# Patient Record
Sex: MALE | Race: OTHER | Employment: UNEMPLOYED | URBAN - METROPOLITAN AREA
[De-identification: names, ages, dates, MRNs, and addresses within clinical notes are randomized per-mention and may not be internally consistent; named-entity substitution may affect disease eponyms.]

---

## 2019-03-11 ENCOUNTER — HOSPITAL ENCOUNTER (EMERGENCY)
Age: 49
Discharge: HOME | End: 2019-03-11
Attending: EMERGENCY MEDICINE
Payer: COMMERCIAL

## 2019-03-11 VITALS
HEART RATE: 94 BPM | WEIGHT: 250 LBS | HEIGHT: 73 IN | BODY MASS INDEX: 33.13 KG/M2 | SYSTOLIC BLOOD PRESSURE: 124 MMHG | DIASTOLIC BLOOD PRESSURE: 84 MMHG | OXYGEN SATURATION: 100 % | RESPIRATION RATE: 18 BRPM | TEMPERATURE: 99.2 F

## 2019-03-11 DIAGNOSIS — Z87.81 H/O FRACTURE OF RADIUS: ICD-10-CM

## 2019-03-11 DIAGNOSIS — F10.920 ACUTE ALCOHOLIC INTOXICATION WITHOUT COMPLICATION (HCC): Primary | ICD-10-CM

## 2019-03-11 DIAGNOSIS — Z91.81 H/O FALL: ICD-10-CM

## 2019-03-11 PROCEDURE — 99283 EMERGENCY DEPT VISIT LOW MDM: CPT

## 2019-03-11 PROCEDURE — 6370000000 HC RX 637 (ALT 250 FOR IP): Performed by: NURSE PRACTITIONER

## 2019-03-11 RX ORDER — TRAMADOL HYDROCHLORIDE 50 MG/1
50 TABLET ORAL EVERY 6 HOURS PRN
Qty: 6 TABLET | Refills: 0 | Status: SHIPPED | OUTPATIENT
Start: 2019-03-11 | End: 2019-03-11 | Stop reason: SDUPTHER

## 2019-03-11 RX ORDER — TRAMADOL HYDROCHLORIDE 50 MG/1
50 TABLET ORAL EVERY 6 HOURS PRN
Qty: 6 TABLET | Refills: 0 | Status: SHIPPED | OUTPATIENT
Start: 2019-03-11 | End: 2019-03-13

## 2019-03-11 RX ORDER — TRAMADOL HYDROCHLORIDE 50 MG/1
50 TABLET ORAL ONCE
Status: COMPLETED | OUTPATIENT
Start: 2019-03-11 | End: 2019-03-11

## 2019-03-11 RX ADMIN — TRAMADOL HYDROCHLORIDE 50 MG: 50 TABLET, FILM COATED ORAL at 14:05

## 2019-03-11 ASSESSMENT — ENCOUNTER SYMPTOMS
SORE THROAT: 0
NAUSEA: 0
ABDOMINAL PAIN: 0
DIARRHEA: 0
RHINORRHEA: 0
CONSTIPATION: 0
SHORTNESS OF BREATH: 0
VOMITING: 0
BLOOD IN STOOL: 0

## 2019-03-11 ASSESSMENT — PAIN SCALES - GENERAL
PAINLEVEL_OUTOF10: 10
PAINLEVEL_OUTOF10: 10

## 2019-03-11 ASSESSMENT — PAIN DESCRIPTION - PAIN TYPE: TYPE: ACUTE PAIN

## 2019-03-18 ENCOUNTER — HOSPITAL ENCOUNTER (EMERGENCY)
Age: 49
Discharge: HOME | End: 2019-03-19
Attending: EMERGENCY MEDICINE
Payer: COMMERCIAL

## 2019-03-18 ENCOUNTER — APPOINTMENT (OUTPATIENT)
Dept: GENERAL RADIOLOGY | Age: 49
End: 2019-03-18
Payer: COMMERCIAL

## 2019-03-18 DIAGNOSIS — S62.101S RIGHT WRIST FRACTURE, SEQUELA: ICD-10-CM

## 2019-03-18 DIAGNOSIS — Z13.9 ENCOUNTER FOR MEDICAL SCREENING EXAMINATION: Primary | ICD-10-CM

## 2019-03-18 DIAGNOSIS — F10.920 ACUTE ALCOHOLIC INTOXICATION WITHOUT COMPLICATION (HCC): ICD-10-CM

## 2019-03-18 PROCEDURE — 4500000023 HC ED LEVEL 3 PROCEDURE

## 2019-03-18 PROCEDURE — 73120 X-RAY EXAM OF HAND: CPT

## 2019-03-18 PROCEDURE — 99283 EMERGENCY DEPT VISIT LOW MDM: CPT

## 2019-03-18 ASSESSMENT — PAIN SCALES - GENERAL
PAINLEVEL_OUTOF10: 10
PAINLEVEL_OUTOF10: 0

## 2019-03-18 ASSESSMENT — PAIN DESCRIPTION - ORIENTATION
ORIENTATION: RIGHT
ORIENTATION: RIGHT

## 2019-03-18 ASSESSMENT — PAIN DESCRIPTION - DESCRIPTORS
DESCRIPTORS: CONSTANT
DESCRIPTORS: ACHING;CONSTANT

## 2019-03-18 ASSESSMENT — PAIN DESCRIPTION - LOCATION
LOCATION: HAND;WRIST
LOCATION: HAND

## 2019-03-18 ASSESSMENT — PAIN DESCRIPTION - PAIN TYPE
TYPE: ACUTE PAIN
TYPE: ACUTE PAIN

## 2019-03-19 VITALS
OXYGEN SATURATION: 96 % | DIASTOLIC BLOOD PRESSURE: 79 MMHG | BODY MASS INDEX: 30.45 KG/M2 | HEIGHT: 73 IN | HEART RATE: 80 BPM | SYSTOLIC BLOOD PRESSURE: 128 MMHG | WEIGHT: 229.72 LBS | RESPIRATION RATE: 18 BRPM | TEMPERATURE: 97.9 F

## 2019-03-19 VITALS
SYSTOLIC BLOOD PRESSURE: 125 MMHG | RESPIRATION RATE: 21 BRPM | DIASTOLIC BLOOD PRESSURE: 70 MMHG | TEMPERATURE: 97.8 F | HEART RATE: 70 BPM | OXYGEN SATURATION: 91 %

## 2019-03-19 DIAGNOSIS — F10.10 ALCOHOL ABUSE: Primary | ICD-10-CM

## 2019-03-19 DIAGNOSIS — F10.920 ACUTE ALCOHOLIC INTOXICATION WITHOUT COMPLICATION (HCC): ICD-10-CM

## 2019-03-19 LAB
CHP ED QC CHECK: YES
GLUCOSE BLD-MCNC: 86 MG/DL (ref 70–99)
PERFORMED ON: NORMAL

## 2019-03-19 PROCEDURE — 99284 EMERGENCY DEPT VISIT MOD MDM: CPT

## 2019-03-19 PROCEDURE — 6360000002 HC RX W HCPCS

## 2019-03-19 RX ORDER — IBUPROFEN 400 MG/1
400 TABLET ORAL EVERY 6 HOURS PRN
Qty: 14 TABLET | Refills: 0 | Status: SHIPPED | OUTPATIENT
Start: 2019-03-19 | End: 2019-03-26 | Stop reason: SDUPTHER

## 2019-03-19 RX ORDER — NALOXONE HYDROCHLORIDE 0.4 MG/ML
0.4 INJECTION, SOLUTION INTRAMUSCULAR; INTRAVENOUS; SUBCUTANEOUS ONCE
Status: COMPLETED | OUTPATIENT
Start: 2019-03-19 | End: 2019-03-19

## 2019-03-19 RX ORDER — NALOXONE HYDROCHLORIDE 0.4 MG/ML
INJECTION, SOLUTION INTRAMUSCULAR; INTRAVENOUS; SUBCUTANEOUS
Status: COMPLETED
Start: 2019-03-19 | End: 2019-03-19

## 2019-03-19 RX ADMIN — NALOXONE HYDROCHLORIDE 0.4 MG: 0.4 INJECTION, SOLUTION INTRAMUSCULAR; INTRAVENOUS; SUBCUTANEOUS at 17:54

## 2019-03-19 ASSESSMENT — ENCOUNTER SYMPTOMS
COLOR CHANGE: 0
RESPIRATORY NEGATIVE: 1
VOMITING: 0
NAUSEA: 0
ABDOMINAL PAIN: 0

## 2019-03-19 ASSESSMENT — PAIN SCALES - GENERAL
PAINLEVEL_OUTOF10: 0
PAINLEVEL_OUTOF10: 0

## 2019-03-21 ENCOUNTER — HOSPITAL ENCOUNTER (EMERGENCY)
Age: 49
Discharge: HOME | End: 2019-03-22
Attending: EMERGENCY MEDICINE
Payer: COMMERCIAL

## 2019-03-21 DIAGNOSIS — S62.101S RIGHT WRIST FRACTURE, SEQUELA: ICD-10-CM

## 2019-03-21 DIAGNOSIS — F10.920 ACUTE ALCOHOLIC INTOXICATION WITHOUT COMPLICATION (HCC): Primary | ICD-10-CM

## 2019-03-21 PROCEDURE — 99284 EMERGENCY DEPT VISIT MOD MDM: CPT

## 2019-03-22 VITALS
BODY MASS INDEX: 30.31 KG/M2 | HEIGHT: 73 IN | OXYGEN SATURATION: 96 % | TEMPERATURE: 98 F | DIASTOLIC BLOOD PRESSURE: 76 MMHG | SYSTOLIC BLOOD PRESSURE: 129 MMHG | RESPIRATION RATE: 20 BRPM | HEART RATE: 105 BPM

## 2019-03-22 PROCEDURE — 6370000000 HC RX 637 (ALT 250 FOR IP): Performed by: EMERGENCY MEDICINE

## 2019-03-22 RX ORDER — ACETAMINOPHEN 325 MG/1
650 TABLET ORAL ONCE
Status: COMPLETED | OUTPATIENT
Start: 2019-03-22 | End: 2019-03-22

## 2019-03-22 RX ADMIN — ACETAMINOPHEN 650 MG: 325 TABLET ORAL at 00:24

## 2019-03-22 ASSESSMENT — PAIN DESCRIPTION - LOCATION: LOCATION: ARM

## 2019-03-22 ASSESSMENT — PAIN SCALES - GENERAL
PAINLEVEL_OUTOF10: 5
PAINLEVEL_OUTOF10: 7
PAINLEVEL_OUTOF10: 10

## 2019-03-22 ASSESSMENT — PAIN DESCRIPTION - FREQUENCY: FREQUENCY: CONTINUOUS

## 2019-03-22 ASSESSMENT — PAIN DESCRIPTION - DESCRIPTORS: DESCRIPTORS: ACHING

## 2019-03-22 ASSESSMENT — PAIN DESCRIPTION - PAIN TYPE: TYPE: ACUTE PAIN

## 2019-03-25 ENCOUNTER — HOSPITAL ENCOUNTER (EMERGENCY)
Age: 49
Discharge: HOME | End: 2019-03-25
Attending: EMERGENCY MEDICINE
Payer: COMMERCIAL

## 2019-03-25 ENCOUNTER — APPOINTMENT (OUTPATIENT)
Dept: CT IMAGING | Age: 49
End: 2019-03-25
Payer: COMMERCIAL

## 2019-03-25 VITALS
OXYGEN SATURATION: 100 % | RESPIRATION RATE: 17 BRPM | TEMPERATURE: 97.6 F | DIASTOLIC BLOOD PRESSURE: 74 MMHG | SYSTOLIC BLOOD PRESSURE: 125 MMHG | HEART RATE: 84 BPM

## 2019-03-25 DIAGNOSIS — F10.20 ALCOHOL USE DISORDER, SEVERE, DEPENDENCE (HCC): Primary | ICD-10-CM

## 2019-03-25 DIAGNOSIS — H70.91 MASTOIDITIS OF RIGHT SIDE: ICD-10-CM

## 2019-03-25 DIAGNOSIS — J32.0 CHRONIC MAXILLARY SINUSITIS: ICD-10-CM

## 2019-03-25 LAB
A/G RATIO: 0.8 (ref 1.1–2.2)
ALBUMIN SERPL-MCNC: 3.6 G/DL (ref 3.4–5)
ALP BLD-CCNC: 146 U/L (ref 40–129)
ALT SERPL-CCNC: 16 U/L (ref 10–40)
AMPHETAMINE SCREEN, URINE: NORMAL
ANION GAP SERPL CALCULATED.3IONS-SCNC: 14 MMOL/L (ref 3–16)
AST SERPL-CCNC: 29 U/L (ref 15–37)
BARBITURATE SCREEN URINE: NORMAL
BENZODIAZEPINE SCREEN, URINE: NORMAL
BILIRUB SERPL-MCNC: 0.3 MG/DL (ref 0–1)
BILIRUBIN URINE: NEGATIVE
BLOOD, URINE: NEGATIVE
BUN BLDV-MCNC: 10 MG/DL (ref 7–20)
CALCIUM SERPL-MCNC: 9.1 MG/DL (ref 8.3–10.6)
CANNABINOID SCREEN URINE: NORMAL
CHLORIDE BLD-SCNC: 101 MMOL/L (ref 99–110)
CLARITY: CLEAR
CO2: 21 MMOL/L (ref 21–32)
COCAINE METABOLITE SCREEN URINE: NORMAL
COLOR: NORMAL
CREAT SERPL-MCNC: 0.7 MG/DL (ref 0.9–1.3)
EKG ATRIAL RATE: 80 BPM
EKG DIAGNOSIS: NORMAL
EKG P AXIS: 41 DEGREES
EKG P-R INTERVAL: 200 MS
EKG Q-T INTERVAL: 414 MS
EKG QRS DURATION: 96 MS
EKG QTC CALCULATION (BAZETT): 477 MS
EKG R AXIS: -7 DEGREES
EKG T AXIS: 71 DEGREES
EKG VENTRICULAR RATE: 80 BPM
ETHANOL: 47 MG/DL (ref 0–0.08)
GFR AFRICAN AMERICAN: >60
GFR NON-AFRICAN AMERICAN: >60
GLOBULIN: 4.4 G/DL
GLUCOSE BLD-MCNC: 108 MG/DL (ref 70–99)
GLUCOSE URINE: NEGATIVE MG/DL
HCT VFR BLD CALC: 34.8 % (ref 40.5–52.5)
HEMOGLOBIN: 10.9 G/DL (ref 13.5–17.5)
KETONES, URINE: NEGATIVE MG/DL
LACTIC ACID: 1.6 MMOL/L (ref 0.4–2)
LEUKOCYTE ESTERASE, URINE: NEGATIVE
Lab: NORMAL
MCH RBC QN AUTO: 22.2 PG (ref 26–34)
MCHC RBC AUTO-ENTMCNC: 31.5 G/DL (ref 31–36)
MCV RBC AUTO: 70.4 FL (ref 80–100)
METHADONE SCREEN, URINE: NORMAL
MICROSCOPIC EXAMINATION: NORMAL
NITRITE, URINE: NEGATIVE
OPIATE SCREEN URINE: NORMAL
OXYCODONE URINE: NORMAL
PDW BLD-RTO: 18.8 % (ref 12.4–15.4)
PH UA: 7
PH UA: 7 (ref 5–8)
PHENCYCLIDINE SCREEN URINE: NORMAL
PLATELET # BLD: 368 K/UL (ref 135–450)
PMV BLD AUTO: 7.1 FL (ref 5–10.5)
POTASSIUM REFLEX MAGNESIUM: 3.9 MMOL/L (ref 3.5–5.1)
PROPOXYPHENE SCREEN: NORMAL
PROTEIN UA: NEGATIVE MG/DL
RBC # BLD: 4.94 M/UL (ref 4.2–5.9)
SODIUM BLD-SCNC: 136 MMOL/L (ref 136–145)
SPECIFIC GRAVITY UA: <=1.005 (ref 1–1.03)
TOTAL PROTEIN: 8 G/DL (ref 6.4–8.2)
TROPONIN: <0.01 NG/ML
URINE REFLEX TO CULTURE: NORMAL
URINE TYPE: NORMAL
UROBILINOGEN, URINE: 0.2 E.U./DL
WBC # BLD: 8.8 K/UL (ref 4–11)

## 2019-03-25 PROCEDURE — 93010 ELECTROCARDIOGRAM REPORT: CPT | Performed by: INTERNAL MEDICINE

## 2019-03-25 PROCEDURE — 80053 COMPREHEN METABOLIC PANEL: CPT

## 2019-03-25 PROCEDURE — 84484 ASSAY OF TROPONIN QUANT: CPT

## 2019-03-25 PROCEDURE — G0480 DRUG TEST DEF 1-7 CLASSES: HCPCS

## 2019-03-25 PROCEDURE — 81003 URINALYSIS AUTO W/O SCOPE: CPT

## 2019-03-25 PROCEDURE — 6360000002 HC RX W HCPCS: Performed by: EMERGENCY MEDICINE

## 2019-03-25 PROCEDURE — 99284 EMERGENCY DEPT VISIT MOD MDM: CPT

## 2019-03-25 PROCEDURE — 2580000003 HC RX 258: Performed by: EMERGENCY MEDICINE

## 2019-03-25 PROCEDURE — 83605 ASSAY OF LACTIC ACID: CPT

## 2019-03-25 PROCEDURE — 96374 THER/PROPH/DIAG INJ IV PUSH: CPT

## 2019-03-25 PROCEDURE — 70450 CT HEAD/BRAIN W/O DYE: CPT

## 2019-03-25 PROCEDURE — 80307 DRUG TEST PRSMV CHEM ANLYZR: CPT

## 2019-03-25 PROCEDURE — 85027 COMPLETE CBC AUTOMATED: CPT

## 2019-03-25 PROCEDURE — 6370000000 HC RX 637 (ALT 250 FOR IP): Performed by: EMERGENCY MEDICINE

## 2019-03-25 PROCEDURE — 96361 HYDRATE IV INFUSION ADD-ON: CPT

## 2019-03-25 PROCEDURE — 93005 ELECTROCARDIOGRAM TRACING: CPT | Performed by: EMERGENCY MEDICINE

## 2019-03-25 RX ORDER — DOXYCYCLINE HYCLATE 100 MG
100 TABLET ORAL 2 TIMES DAILY
Qty: 28 TABLET | Refills: 0 | Status: SHIPPED | OUTPATIENT
Start: 2019-03-25 | End: 2019-04-08

## 2019-03-25 RX ORDER — 0.9 % SODIUM CHLORIDE 0.9 %
1000 INTRAVENOUS SOLUTION INTRAVENOUS ONCE
Status: COMPLETED | OUTPATIENT
Start: 2019-03-25 | End: 2019-03-25

## 2019-03-25 RX ORDER — NALOXONE HYDROCHLORIDE 0.4 MG/ML
0.4 INJECTION, SOLUTION INTRAMUSCULAR; INTRAVENOUS; SUBCUTANEOUS ONCE
Status: COMPLETED | OUTPATIENT
Start: 2019-03-25 | End: 2019-03-25

## 2019-03-25 RX ORDER — ACETAMINOPHEN 325 MG/1
650 TABLET ORAL ONCE
Status: COMPLETED | OUTPATIENT
Start: 2019-03-25 | End: 2019-03-25

## 2019-03-25 RX ADMIN — SODIUM CHLORIDE 1000 ML: 9 INJECTION, SOLUTION INTRAVENOUS at 12:07

## 2019-03-25 RX ADMIN — NALOXONE HYDROCHLORIDE 0.4 MG: 0.4 INJECTION, SOLUTION INTRAMUSCULAR; INTRAVENOUS; SUBCUTANEOUS at 12:07

## 2019-03-25 RX ADMIN — ACETAMINOPHEN 650 MG: 325 TABLET ORAL at 17:25

## 2019-03-25 ASSESSMENT — PAIN SCALES - GENERAL: PAINLEVEL_OUTOF10: 5

## 2019-03-26 ENCOUNTER — HOSPITAL ENCOUNTER (EMERGENCY)
Age: 49
Discharge: HOME | End: 2019-03-26
Payer: COMMERCIAL

## 2019-03-26 VITALS
HEART RATE: 98 BPM | WEIGHT: 229.94 LBS | DIASTOLIC BLOOD PRESSURE: 99 MMHG | RESPIRATION RATE: 16 BRPM | BODY MASS INDEX: 30.34 KG/M2 | SYSTOLIC BLOOD PRESSURE: 135 MMHG | OXYGEN SATURATION: 100 % | TEMPERATURE: 98.1 F

## 2019-03-26 DIAGNOSIS — Z76.0 ENCOUNTER FOR MEDICATION REFILL: Primary | ICD-10-CM

## 2019-03-26 DIAGNOSIS — F10.11 HISTORY OF ALCOHOL ABUSE: ICD-10-CM

## 2019-03-26 PROCEDURE — 99284 EMERGENCY DEPT VISIT MOD MDM: CPT

## 2019-03-26 RX ORDER — BENZONATATE 100 MG/1
100 CAPSULE ORAL 2 TIMES DAILY PRN
Qty: 30 CAPSULE | Refills: 0 | Status: SHIPPED | OUTPATIENT
Start: 2019-03-26 | End: 2019-04-02

## 2019-03-26 RX ORDER — IBUPROFEN 400 MG/1
400 TABLET ORAL EVERY 6 HOURS PRN
Qty: 30 TABLET | Refills: 0 | Status: SHIPPED | OUTPATIENT
Start: 2019-03-26 | End: 2019-09-11 | Stop reason: ALTCHOICE

## 2019-03-26 ASSESSMENT — PAIN DESCRIPTION - ONSET: ONSET: AWAKENED FROM SLEEP

## 2019-03-26 ASSESSMENT — PAIN DESCRIPTION - LOCATION: LOCATION: GROIN

## 2019-03-26 ASSESSMENT — PAIN SCALES - GENERAL: PAINLEVEL_OUTOF10: 10

## 2019-03-26 ASSESSMENT — ENCOUNTER SYMPTOMS
ABDOMINAL PAIN: 0
EYE REDNESS: 0
EYE DISCHARGE: 0
NAUSEA: 0
BACK PAIN: 0
SHORTNESS OF BREATH: 0
CHOKING: 0
FACIAL SWELLING: 0
VOMITING: 0
APNEA: 0

## 2019-03-26 ASSESSMENT — PAIN DESCRIPTION - ORIENTATION: ORIENTATION: MID

## 2019-03-26 ASSESSMENT — PAIN DESCRIPTION - PAIN TYPE: TYPE: ACUTE PAIN

## 2019-03-26 ASSESSMENT — PAIN DESCRIPTION - FREQUENCY: FREQUENCY: CONTINUOUS

## 2019-03-26 ASSESSMENT — PAIN DESCRIPTION - PROGRESSION: CLINICAL_PROGRESSION: GRADUALLY WORSENING

## 2019-03-26 ASSESSMENT — PAIN DESCRIPTION - DESCRIPTORS: DESCRIPTORS: SHARP

## 2019-03-27 ENCOUNTER — HOSPITAL ENCOUNTER (EMERGENCY)
Age: 49
Discharge: HOME | End: 2019-03-27
Attending: EMERGENCY MEDICINE
Payer: COMMERCIAL

## 2019-03-27 VITALS
RESPIRATION RATE: 14 BRPM | DIASTOLIC BLOOD PRESSURE: 78 MMHG | SYSTOLIC BLOOD PRESSURE: 133 MMHG | WEIGHT: 230 LBS | OXYGEN SATURATION: 93 % | BODY MASS INDEX: 30.48 KG/M2 | HEART RATE: 85 BPM | TEMPERATURE: 98 F | HEIGHT: 73 IN

## 2019-03-27 DIAGNOSIS — S52.501D CLOSED FRACTURE DISTAL RADIUS AND ULNA, RIGHT, WITH ROUTINE HEALING, SUBSEQUENT ENCOUNTER: ICD-10-CM

## 2019-03-27 DIAGNOSIS — F10.11 HISTORY OF ALCOHOL ABUSE: ICD-10-CM

## 2019-03-27 DIAGNOSIS — F19.10 POLYSUBSTANCE ABUSE (HCC): Primary | ICD-10-CM

## 2019-03-27 DIAGNOSIS — S52.601D CLOSED FRACTURE DISTAL RADIUS AND ULNA, RIGHT, WITH ROUTINE HEALING, SUBSEQUENT ENCOUNTER: ICD-10-CM

## 2019-03-27 DIAGNOSIS — Z86.59 HISTORY OF SCHIZOPHRENIA: ICD-10-CM

## 2019-03-27 LAB
A/G RATIO: 1 (ref 1.1–2.2)
ACETAMINOPHEN LEVEL: <5 UG/ML (ref 10–30)
ALBUMIN SERPL-MCNC: 4.2 G/DL (ref 3.4–5)
ALP BLD-CCNC: 154 U/L (ref 40–129)
ALT SERPL-CCNC: 15 U/L (ref 10–40)
ANION GAP SERPL CALCULATED.3IONS-SCNC: 13 MMOL/L (ref 3–16)
AST SERPL-CCNC: 23 U/L (ref 15–37)
BASOPHILS ABSOLUTE: 0 K/UL (ref 0–0.2)
BASOPHILS RELATIVE PERCENT: 0.1 %
BILIRUB SERPL-MCNC: 0.6 MG/DL (ref 0–1)
BILIRUBIN DIRECT: <0.2 MG/DL (ref 0–0.3)
BILIRUBIN, INDIRECT: NORMAL MG/DL (ref 0–1)
BUN BLDV-MCNC: 9 MG/DL (ref 7–20)
CALCIUM SERPL-MCNC: 9.4 MG/DL (ref 8.3–10.6)
CHLORIDE BLD-SCNC: 94 MMOL/L (ref 99–110)
CO2: 23 MMOL/L (ref 21–32)
CREAT SERPL-MCNC: 0.7 MG/DL (ref 0.9–1.3)
EOSINOPHILS ABSOLUTE: 0.2 K/UL (ref 0–0.6)
EOSINOPHILS RELATIVE PERCENT: 3 %
ETHANOL: NORMAL MG/DL (ref 0–0.08)
GFR AFRICAN AMERICAN: >60
GFR NON-AFRICAN AMERICAN: >60
GLOBULIN: 4.1 G/DL
GLUCOSE BLD-MCNC: 91 MG/DL (ref 70–99)
HCT VFR BLD CALC: 36.1 % (ref 40.5–52.5)
HEMOGLOBIN: 11.5 G/DL (ref 13.5–17.5)
INR BLD: 1.18 (ref 0.86–1.14)
LIPASE: 10 U/L (ref 13–60)
LYMPHOCYTES ABSOLUTE: 1.5 K/UL (ref 1–5.1)
LYMPHOCYTES RELATIVE PERCENT: 21.3 %
MCH RBC QN AUTO: 22.4 PG (ref 26–34)
MCHC RBC AUTO-ENTMCNC: 31.9 G/DL (ref 31–36)
MCV RBC AUTO: 70.1 FL (ref 80–100)
MONOCYTES ABSOLUTE: 0.8 K/UL (ref 0–1.3)
MONOCYTES RELATIVE PERCENT: 10.8 %
NEUTROPHILS ABSOLUTE: 4.5 K/UL (ref 1.7–7.7)
NEUTROPHILS RELATIVE PERCENT: 64.8 %
PDW BLD-RTO: 18.3 % (ref 12.4–15.4)
PLATELET # BLD: 416 K/UL (ref 135–450)
PMV BLD AUTO: 6.9 FL (ref 5–10.5)
POTASSIUM REFLEX MAGNESIUM: 4 MMOL/L (ref 3.5–5.1)
PROTHROMBIN TIME: 13.5 SEC (ref 9.8–13)
RBC # BLD: 5.15 M/UL (ref 4.2–5.9)
SALICYLATE, SERUM: <0.3 MG/DL (ref 15–30)
SODIUM BLD-SCNC: 130 MMOL/L (ref 136–145)
TOTAL PROTEIN: 8.3 G/DL (ref 6.4–8.2)
WBC # BLD: 7 K/UL (ref 4–11)

## 2019-03-27 PROCEDURE — 2500000003 HC RX 250 WO HCPCS: Performed by: PHYSICIAN ASSISTANT

## 2019-03-27 PROCEDURE — 85610 PROTHROMBIN TIME: CPT

## 2019-03-27 PROCEDURE — 6360000002 HC RX W HCPCS: Performed by: PHYSICIAN ASSISTANT

## 2019-03-27 PROCEDURE — 80053 COMPREHEN METABOLIC PANEL: CPT

## 2019-03-27 PROCEDURE — 83690 ASSAY OF LIPASE: CPT

## 2019-03-27 PROCEDURE — G0480 DRUG TEST DEF 1-7 CLASSES: HCPCS

## 2019-03-27 PROCEDURE — 2580000003 HC RX 258: Performed by: PHYSICIAN ASSISTANT

## 2019-03-27 PROCEDURE — 99284 EMERGENCY DEPT VISIT MOD MDM: CPT

## 2019-03-27 PROCEDURE — 36415 COLL VENOUS BLD VENIPUNCTURE: CPT

## 2019-03-27 PROCEDURE — 85025 COMPLETE CBC W/AUTO DIFF WBC: CPT

## 2019-03-27 PROCEDURE — 96360 HYDRATION IV INFUSION INIT: CPT

## 2019-03-27 PROCEDURE — 96361 HYDRATE IV INFUSION ADD-ON: CPT

## 2019-03-27 RX ADMIN — FOLIC ACID: 5 INJECTION, SOLUTION INTRAMUSCULAR; INTRAVENOUS; SUBCUTANEOUS at 17:35

## 2019-03-27 ASSESSMENT — ENCOUNTER SYMPTOMS
SORE THROAT: 0
SHORTNESS OF BREATH: 0
VOMITING: 0
ABDOMINAL PAIN: 0
DIARRHEA: 0
NAUSEA: 0
CHEST TIGHTNESS: 0
COLOR CHANGE: 0

## 2019-03-27 ASSESSMENT — PAIN DESCRIPTION - PAIN TYPE: TYPE: ACUTE PAIN

## 2019-03-27 ASSESSMENT — PAIN DESCRIPTION - INTENSITY: RATING_2: 10

## 2019-03-27 ASSESSMENT — PAIN DESCRIPTION - LOCATION
LOCATION_2: BACK
LOCATION: ANKLE

## 2019-03-27 ASSESSMENT — PAIN SCALES - GENERAL: PAINLEVEL_OUTOF10: 10

## 2019-03-30 ENCOUNTER — HOSPITAL ENCOUNTER (EMERGENCY)
Age: 49
Discharge: HOME | End: 2019-03-30
Attending: EMERGENCY MEDICINE
Payer: COMMERCIAL

## 2019-03-30 VITALS
WEIGHT: 230 LBS | BODY MASS INDEX: 30.48 KG/M2 | RESPIRATION RATE: 18 BRPM | TEMPERATURE: 98.3 F | HEIGHT: 73 IN | SYSTOLIC BLOOD PRESSURE: 116 MMHG | OXYGEN SATURATION: 99 % | HEART RATE: 88 BPM | DIASTOLIC BLOOD PRESSURE: 68 MMHG

## 2019-03-30 DIAGNOSIS — M25.531 RIGHT WRIST PAIN: Primary | ICD-10-CM

## 2019-03-30 PROCEDURE — 99283 EMERGENCY DEPT VISIT LOW MDM: CPT

## 2019-03-30 ASSESSMENT — PAIN SCALES - GENERAL
PAINLEVEL_OUTOF10: 0
PAINLEVEL_OUTOF10: 10

## 2019-03-30 ASSESSMENT — PAIN DESCRIPTION - PAIN TYPE: TYPE: ACUTE PAIN

## 2019-03-30 ASSESSMENT — PAIN DESCRIPTION - FREQUENCY: FREQUENCY: CONTINUOUS

## 2019-03-30 ASSESSMENT — PAIN DESCRIPTION - LOCATION: LOCATION: KNEE

## 2019-03-31 ASSESSMENT — ENCOUNTER SYMPTOMS: COLOR CHANGE: 0

## 2019-03-31 NOTE — ED PROVIDER NOTES
Allergic/Immunologic: Negative for immunocompromised state. Neurological: Negative for weakness and numbness. All other systems reviewed and are negative. Positives and Pertinent negatives as per HPI. Except as noted above in the ROS, problem specific ROS was completed and is negative. Physical Exam:  Physical Exam   Constitutional: He is oriented to person, place, and time. Vital signs are normal. He appears well-developed and well-nourished. Non-toxic appearance. No distress. HENT:   Head: Normocephalic and atraumatic. Eyes: Conjunctivae are normal. No scleral icterus. Neck: Normal range of motion. No JVD present. Pulmonary/Chest: Effort normal. No respiratory distress. Musculoskeletal: Normal range of motion. Patient has what appears to be a volar OCL on the right distal arm. The hand is exposed and is pink and well perfused. Capillary refill less than 2 seconds. Muscles appear soft. On my limited exam he is neurovascularly intact without deficits. Neurological: He is alert and oriented to person, place, and time. No cranial nerve deficit. Skin: Skin is warm, dry and intact. No rash noted. Psychiatric: He has a normal mood and affect. Nursing note and vitals reviewed. MEDICAL DECISION MAKING    Vitals:    Vitals:    03/30/19 2200 03/30/19 2338   BP: 114/66 116/68   Pulse: 87 88   Resp: 16 18   Temp: 98.1 °F (36.7 °C) 98.3 °F (36.8 °C)   SpO2: 99%    Weight: 230 lb (104.3 kg)    Height: 6' 1\" (1.854 m)        LABS:Labs Reviewed - No data to display     Remainder of labs reviewed and werenegative at this time or not returned at the time of this note.     RADIOLOGY:   Non-plain film images such as CT, Ultrasound and MRI are read by the radiologist. Celedonio Kanner, APRN - CNP have directly visualized the radiologic plain film image(s) with the below findings:        Interpretation per the Radiologist below, if available at the time of thisnote:    No orders to display patient was reviewed today.  YEMI Del Castillo CNP)      (Please note the MDM and HPI sections of this note were completed with a voice recognition program.  Efforts weremade to edit the dictations but occasionally words are mis-transcribed.)    Electronically signed, YEMI Del Castillo CNP,           YEMI Del Castillo CNP  03/31/19 1096
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
## 2019-09-11 ENCOUNTER — HOSPITAL ENCOUNTER (INPATIENT)
Age: 49
LOS: 2 days | Discharge: HOME | End: 2019-09-16
Attending: EMERGENCY MEDICINE | Admitting: INTERNAL MEDICINE
Payer: COMMERCIAL

## 2019-09-11 DIAGNOSIS — F19.10 POLYSUBSTANCE ABUSE (HCC): ICD-10-CM

## 2019-09-11 DIAGNOSIS — Z87.898 HISTORY OF SEIZURE: ICD-10-CM

## 2019-09-11 DIAGNOSIS — F10.939 ALCOHOL WITHDRAWAL SYNDROME WITH COMPLICATION (HCC): Primary | ICD-10-CM

## 2019-09-11 PROBLEM — F10.121 ALCOHOL INTOXICATION DELIRIUM (HCC): Status: ACTIVE | Noted: 2019-09-11

## 2019-09-11 PROBLEM — F10.929 ALCOHOL INTOXICATION (HCC): Status: ACTIVE | Noted: 2019-09-11

## 2019-09-11 LAB
A/G RATIO: 1.1 (ref 1.1–2.2)
ACETAMINOPHEN LEVEL: <5 UG/ML (ref 10–30)
ALBUMIN SERPL-MCNC: 4.4 G/DL (ref 3.4–5)
ALP BLD-CCNC: 127 U/L (ref 40–129)
ALT SERPL-CCNC: 13 U/L (ref 10–40)
AMPHETAMINE SCREEN, URINE: ABNORMAL
ANION GAP SERPL CALCULATED.3IONS-SCNC: 16 MMOL/L (ref 3–16)
AST SERPL-CCNC: 25 U/L (ref 15–37)
BARBITURATE SCREEN URINE: ABNORMAL
BASOPHILS ABSOLUTE: 0.1 K/UL (ref 0–0.2)
BASOPHILS RELATIVE PERCENT: 1.4 %
BENZODIAZEPINE SCREEN, URINE: POSITIVE
BILIRUB SERPL-MCNC: 0.3 MG/DL (ref 0–1)
BILIRUBIN DIRECT: <0.2 MG/DL (ref 0–0.3)
BILIRUBIN URINE: NEGATIVE
BILIRUBIN, INDIRECT: ABNORMAL MG/DL (ref 0–1)
BLOOD, URINE: NEGATIVE
BUN BLDV-MCNC: 7 MG/DL (ref 7–20)
CALCIUM SERPL-MCNC: 9 MG/DL (ref 8.3–10.6)
CANNABINOID SCREEN URINE: ABNORMAL
CHLORIDE BLD-SCNC: 99 MMOL/L (ref 99–110)
CLARITY: ABNORMAL
CO2: 21 MMOL/L (ref 21–32)
COCAINE METABOLITE SCREEN URINE: ABNORMAL
COLOR: YELLOW
CREAT SERPL-MCNC: 0.8 MG/DL (ref 0.9–1.3)
EOSINOPHILS ABSOLUTE: 0.2 K/UL (ref 0–0.6)
EOSINOPHILS RELATIVE PERCENT: 3.4 %
EPITHELIAL CELLS, UA: 0 /HPF (ref 0–5)
ETHANOL: 180 MG/DL (ref 0–0.08)
GFR AFRICAN AMERICAN: >60
GFR NON-AFRICAN AMERICAN: >60
GLOBULIN: 4.1 G/DL
GLUCOSE BLD-MCNC: 97 MG/DL (ref 70–99)
GLUCOSE URINE: NEGATIVE MG/DL
HCT VFR BLD CALC: 42 % (ref 40.5–52.5)
HEMOGLOBIN: 13.9 G/DL (ref 13.5–17.5)
HYALINE CASTS: 1 /LPF (ref 0–8)
INR BLD: 0.9 (ref 0.86–1.14)
KETONES, URINE: NEGATIVE MG/DL
LEUKOCYTE ESTERASE, URINE: NEGATIVE
LIPASE: 20 U/L (ref 13–60)
LYMPHOCYTES ABSOLUTE: 1.8 K/UL (ref 1–5.1)
LYMPHOCYTES RELATIVE PERCENT: 32.3 %
Lab: ABNORMAL
MAGNESIUM: 2.3 MG/DL (ref 1.8–2.4)
MCH RBC QN AUTO: 28.7 PG (ref 26–34)
MCHC RBC AUTO-ENTMCNC: 33.2 G/DL (ref 31–36)
MCV RBC AUTO: 86.4 FL (ref 80–100)
METHADONE SCREEN, URINE: ABNORMAL
MICROSCOPIC EXAMINATION: YES
MONOCYTES ABSOLUTE: 0.6 K/UL (ref 0–1.3)
MONOCYTES RELATIVE PERCENT: 11.3 %
NEUTROPHILS ABSOLUTE: 2.9 K/UL (ref 1.7–7.7)
NEUTROPHILS RELATIVE PERCENT: 51.6 %
NITRITE, URINE: NEGATIVE
OPIATE SCREEN URINE: ABNORMAL
OXYCODONE URINE: ABNORMAL
PDW BLD-RTO: 15.3 % (ref 12.4–15.4)
PH UA: 5.5
PH UA: 5.5 (ref 5–8)
PHENCYCLIDINE SCREEN URINE: ABNORMAL
PLATELET # BLD: 175 K/UL (ref 135–450)
PMV BLD AUTO: 7.7 FL (ref 5–10.5)
POTASSIUM SERPL-SCNC: 4.2 MMOL/L (ref 3.5–5.1)
PROPOXYPHENE SCREEN: ABNORMAL
PROTEIN UA: NEGATIVE MG/DL
PROTHROMBIN TIME: 10.3 SEC (ref 9.8–13)
RBC # BLD: 4.87 M/UL (ref 4.2–5.9)
RBC UA: 0 /HPF (ref 0–4)
SALICYLATE, SERUM: <0.3 MG/DL (ref 15–30)
SODIUM BLD-SCNC: 136 MMOL/L (ref 136–145)
SPECIFIC GRAVITY UA: 1.01 (ref 1–1.03)
TOTAL PROTEIN: 8.5 G/DL (ref 6.4–8.2)
TROPONIN: <0.01 NG/ML
URINE REFLEX TO CULTURE: ABNORMAL
URINE TYPE: ABNORMAL
UROBILINOGEN, URINE: 0.2 E.U./DL
WBC # BLD: 5.6 K/UL (ref 4–11)
WBC UA: 0 /HPF (ref 0–5)

## 2019-09-11 PROCEDURE — 84484 ASSAY OF TROPONIN QUANT: CPT

## 2019-09-11 PROCEDURE — 2580000003 HC RX 258: Performed by: PHYSICIAN ASSISTANT

## 2019-09-11 PROCEDURE — 99284 EMERGENCY DEPT VISIT MOD MDM: CPT

## 2019-09-11 PROCEDURE — 83690 ASSAY OF LIPASE: CPT

## 2019-09-11 PROCEDURE — 80307 DRUG TEST PRSMV CHEM ANLYZR: CPT

## 2019-09-11 PROCEDURE — 82248 BILIRUBIN DIRECT: CPT

## 2019-09-11 PROCEDURE — 81001 URINALYSIS AUTO W/SCOPE: CPT

## 2019-09-11 PROCEDURE — 36415 COLL VENOUS BLD VENIPUNCTURE: CPT

## 2019-09-11 PROCEDURE — 85025 COMPLETE CBC W/AUTO DIFF WBC: CPT

## 2019-09-11 PROCEDURE — 85610 PROTHROMBIN TIME: CPT

## 2019-09-11 PROCEDURE — 96365 THER/PROPH/DIAG IV INF INIT: CPT

## 2019-09-11 PROCEDURE — 6360000002 HC RX W HCPCS: Performed by: PHYSICIAN ASSISTANT

## 2019-09-11 PROCEDURE — 6360000002 HC RX W HCPCS: Performed by: INTERNAL MEDICINE

## 2019-09-11 PROCEDURE — 2580000003 HC RX 258: Performed by: INTERNAL MEDICINE

## 2019-09-11 PROCEDURE — 80053 COMPREHEN METABOLIC PANEL: CPT

## 2019-09-11 PROCEDURE — 96372 THER/PROPH/DIAG INJ SC/IM: CPT

## 2019-09-11 PROCEDURE — 2500000003 HC RX 250 WO HCPCS: Performed by: PHYSICIAN ASSISTANT

## 2019-09-11 PROCEDURE — G0378 HOSPITAL OBSERVATION PER HR: HCPCS

## 2019-09-11 PROCEDURE — 83735 ASSAY OF MAGNESIUM: CPT

## 2019-09-11 PROCEDURE — G0480 DRUG TEST DEF 1-7 CLASSES: HCPCS

## 2019-09-11 PROCEDURE — 96366 THER/PROPH/DIAG IV INF ADDON: CPT

## 2019-09-11 PROCEDURE — 6370000000 HC RX 637 (ALT 250 FOR IP): Performed by: PHYSICIAN ASSISTANT

## 2019-09-11 PROCEDURE — 6370000000 HC RX 637 (ALT 250 FOR IP): Performed by: INTERNAL MEDICINE

## 2019-09-11 PROCEDURE — 6370000000 HC RX 637 (ALT 250 FOR IP)

## 2019-09-11 RX ORDER — ONDANSETRON 2 MG/ML
4 INJECTION INTRAMUSCULAR; INTRAVENOUS EVERY 6 HOURS PRN
Status: DISCONTINUED | OUTPATIENT
Start: 2019-09-11 | End: 2019-09-16 | Stop reason: HOSPADM

## 2019-09-11 RX ORDER — SODIUM CHLORIDE 0.9 % (FLUSH) 0.9 %
10 SYRINGE (ML) INJECTION EVERY 12 HOURS SCHEDULED
Status: DISCONTINUED | OUTPATIENT
Start: 2019-09-11 | End: 2019-09-16 | Stop reason: HOSPADM

## 2019-09-11 RX ORDER — LORAZEPAM 2 MG/ML
1 INJECTION INTRAMUSCULAR
Status: DISCONTINUED | OUTPATIENT
Start: 2019-09-11 | End: 2019-09-16 | Stop reason: HOSPADM

## 2019-09-11 RX ORDER — SODIUM CHLORIDE 0.9 % (FLUSH) 0.9 %
10 SYRINGE (ML) INJECTION PRN
Status: DISCONTINUED | OUTPATIENT
Start: 2019-09-11 | End: 2019-09-16 | Stop reason: HOSPADM

## 2019-09-11 RX ORDER — LORAZEPAM 1 MG/1
1 TABLET ORAL
Status: DISCONTINUED | OUTPATIENT
Start: 2019-09-11 | End: 2019-09-16 | Stop reason: HOSPADM

## 2019-09-11 RX ORDER — MULTIVITAMIN WITH FOLIC ACID 400 MCG
1 TABLET ORAL DAILY
Status: DISCONTINUED | OUTPATIENT
Start: 2019-09-11 | End: 2019-09-16 | Stop reason: HOSPADM

## 2019-09-11 RX ORDER — LORAZEPAM 1 MG/1
2 TABLET ORAL
Status: DISCONTINUED | OUTPATIENT
Start: 2019-09-11 | End: 2019-09-16 | Stop reason: HOSPADM

## 2019-09-11 RX ORDER — LORAZEPAM 2 MG/ML
3 INJECTION INTRAMUSCULAR
Status: DISCONTINUED | OUTPATIENT
Start: 2019-09-11 | End: 2019-09-16 | Stop reason: HOSPADM

## 2019-09-11 RX ORDER — LORAZEPAM 2 MG/ML
2 INJECTION INTRAMUSCULAR
Status: DISCONTINUED | OUTPATIENT
Start: 2019-09-11 | End: 2019-09-16 | Stop reason: HOSPADM

## 2019-09-11 RX ORDER — ACETAMINOPHEN 325 MG/1
650 TABLET ORAL EVERY 4 HOURS PRN
Status: DISCONTINUED | OUTPATIENT
Start: 2019-09-11 | End: 2019-09-16 | Stop reason: HOSPADM

## 2019-09-11 RX ORDER — SODIUM CHLORIDE 0.9 % (FLUSH) 0.9 %
10 SYRINGE (ML) INJECTION EVERY 12 HOURS SCHEDULED
Status: DISCONTINUED | OUTPATIENT
Start: 2019-09-11 | End: 2019-09-11 | Stop reason: SDUPTHER

## 2019-09-11 RX ORDER — THIAMINE HCL 100 MG
100 TABLET ORAL DAILY
Status: DISCONTINUED | OUTPATIENT
Start: 2019-09-11 | End: 2019-09-16 | Stop reason: HOSPADM

## 2019-09-11 RX ORDER — FOLIC ACID 1 MG/1
1 TABLET ORAL DAILY
Status: DISCONTINUED | OUTPATIENT
Start: 2019-09-11 | End: 2019-09-16 | Stop reason: HOSPADM

## 2019-09-11 RX ORDER — LORAZEPAM 1 MG/1
TABLET ORAL
Status: COMPLETED
Start: 2019-09-11 | End: 2019-09-11

## 2019-09-11 RX ORDER — SODIUM CHLORIDE 0.9 % (FLUSH) 0.9 %
10 SYRINGE (ML) INJECTION PRN
Status: DISCONTINUED | OUTPATIENT
Start: 2019-09-11 | End: 2019-09-11 | Stop reason: SDUPTHER

## 2019-09-11 RX ORDER — DOCUSATE SODIUM 100 MG/1
100 CAPSULE, LIQUID FILLED ORAL 2 TIMES DAILY PRN
Status: DISCONTINUED | OUTPATIENT
Start: 2019-09-11 | End: 2019-09-16 | Stop reason: HOSPADM

## 2019-09-11 RX ORDER — LORAZEPAM 1 MG/1
3 TABLET ORAL
Status: DISCONTINUED | OUTPATIENT
Start: 2019-09-11 | End: 2019-09-16 | Stop reason: HOSPADM

## 2019-09-11 RX ORDER — LORAZEPAM 1 MG/1
4 TABLET ORAL
Status: DISCONTINUED | OUTPATIENT
Start: 2019-09-11 | End: 2019-09-16 | Stop reason: HOSPADM

## 2019-09-11 RX ORDER — LORAZEPAM 2 MG/ML
4 INJECTION INTRAMUSCULAR
Status: DISCONTINUED | OUTPATIENT
Start: 2019-09-11 | End: 2019-09-16 | Stop reason: HOSPADM

## 2019-09-11 RX ADMIN — LORAZEPAM 1 MG: 1 TABLET ORAL at 16:21

## 2019-09-11 RX ADMIN — LORAZEPAM 1 MG: 1 TABLET ORAL at 21:11

## 2019-09-11 RX ADMIN — THERA TABS 1 TABLET: TAB at 18:31

## 2019-09-11 RX ADMIN — Medication 100 MG: at 21:11

## 2019-09-11 RX ADMIN — ENOXAPARIN SODIUM 40 MG: 40 INJECTION SUBCUTANEOUS at 18:32

## 2019-09-11 RX ADMIN — ACETAMINOPHEN 650 MG: 325 TABLET, FILM COATED ORAL at 18:30

## 2019-09-11 RX ADMIN — LORAZEPAM 1 MG: 1 TABLET ORAL at 18:40

## 2019-09-11 RX ADMIN — FOLIC ACID 1 MG: 1 TABLET ORAL at 18:31

## 2019-09-11 RX ADMIN — THIAMINE HYDROCHLORIDE: 100 INJECTION, SOLUTION INTRAMUSCULAR; INTRAVENOUS at 10:44

## 2019-09-11 RX ADMIN — Medication 10 ML: at 21:12

## 2019-09-11 ASSESSMENT — ENCOUNTER SYMPTOMS
ABDOMINAL PAIN: 0
CHEST TIGHTNESS: 0
VOMITING: 0
SHORTNESS OF BREATH: 0
NAUSEA: 0
DIARRHEA: 0

## 2019-09-11 ASSESSMENT — PAIN SCALES - GENERAL
PAINLEVEL_OUTOF10: 8
PAINLEVEL_OUTOF10: 0
PAINLEVEL_OUTOF10: 8

## 2019-09-11 ASSESSMENT — PAIN DESCRIPTION - PAIN TYPE: TYPE: CHRONIC PAIN

## 2019-09-11 NOTE — ED NOTES
Pharmacy Medication History Note      List of current medications patient is taking is complete. Source of information: patient    Changes made to medication list:  Medications flagged for removal (include reason, ex. noncompliance):  N/A    Medications removed (include reason, ex. therapy complete or physician discontinued): Ibuprofen- therapy complete    Medications added/doses adjusted:  N/A    Other notes (ex. Recent course of antibiotics, Coumadin dosing):  Denies use of other OTC or herbal medications. Last dose times updated. Debi Vaz [facility]    No current facility-administered medications on file prior to encounter.         Current Outpatient Medications on File Prior to Encounter   Medication Sig Dispense Refill    [DISCONTINUED] ibuprofen (IBU) 400 MG tablet Take 1 tablet by mouth every 6 hours as needed for Pain 30 tablet 0

## 2019-09-12 LAB
A/G RATIO: 1.1 (ref 1.1–2.2)
ALBUMIN SERPL-MCNC: 4.1 G/DL (ref 3.4–5)
ALP BLD-CCNC: 120 U/L (ref 40–129)
ALT SERPL-CCNC: 11 U/L (ref 10–40)
ANION GAP SERPL CALCULATED.3IONS-SCNC: 9 MMOL/L (ref 3–16)
AST SERPL-CCNC: 21 U/L (ref 15–37)
BASOPHILS ABSOLUTE: 0 K/UL (ref 0–0.2)
BASOPHILS RELATIVE PERCENT: 1.1 %
BILIRUB SERPL-MCNC: 0.4 MG/DL (ref 0–1)
BUN BLDV-MCNC: 9 MG/DL (ref 7–20)
CALCIUM SERPL-MCNC: 9.3 MG/DL (ref 8.3–10.6)
CHLORIDE BLD-SCNC: 104 MMOL/L (ref 99–110)
CO2: 24 MMOL/L (ref 21–32)
CREAT SERPL-MCNC: 0.7 MG/DL (ref 0.9–1.3)
EOSINOPHILS ABSOLUTE: 0.3 K/UL (ref 0–0.6)
EOSINOPHILS RELATIVE PERCENT: 6.4 %
GFR AFRICAN AMERICAN: >60
GFR NON-AFRICAN AMERICAN: >60
GLOBULIN: 3.8 G/DL
GLUCOSE BLD-MCNC: 111 MG/DL (ref 70–99)
HCT VFR BLD CALC: 44.2 % (ref 40.5–52.5)
HEMOGLOBIN: 14.7 G/DL (ref 13.5–17.5)
LYMPHOCYTES ABSOLUTE: 1.4 K/UL (ref 1–5.1)
LYMPHOCYTES RELATIVE PERCENT: 34.3 %
MCH RBC QN AUTO: 28.8 PG (ref 26–34)
MCHC RBC AUTO-ENTMCNC: 33.2 G/DL (ref 31–36)
MCV RBC AUTO: 86.7 FL (ref 80–100)
MONOCYTES ABSOLUTE: 0.5 K/UL (ref 0–1.3)
MONOCYTES RELATIVE PERCENT: 12.4 %
NEUTROPHILS ABSOLUTE: 1.8 K/UL (ref 1.7–7.7)
NEUTROPHILS RELATIVE PERCENT: 45.8 %
PDW BLD-RTO: 15.4 % (ref 12.4–15.4)
PHOSPHORUS: 3.9 MG/DL (ref 2.5–4.9)
PLATELET # BLD: 164 K/UL (ref 135–450)
PMV BLD AUTO: 7.5 FL (ref 5–10.5)
POTASSIUM REFLEX MAGNESIUM: 4.1 MMOL/L (ref 3.5–5.1)
RBC # BLD: 5.1 M/UL (ref 4.2–5.9)
SODIUM BLD-SCNC: 137 MMOL/L (ref 136–145)
TOTAL PROTEIN: 7.9 G/DL (ref 6.4–8.2)
TROPONIN: <0.01 NG/ML
WBC # BLD: 3.9 K/UL (ref 4–11)

## 2019-09-12 PROCEDURE — 36415 COLL VENOUS BLD VENIPUNCTURE: CPT

## 2019-09-12 PROCEDURE — 6360000002 HC RX W HCPCS: Performed by: INTERNAL MEDICINE

## 2019-09-12 PROCEDURE — 96372 THER/PROPH/DIAG INJ SC/IM: CPT

## 2019-09-12 PROCEDURE — 6370000000 HC RX 637 (ALT 250 FOR IP): Performed by: INTERNAL MEDICINE

## 2019-09-12 PROCEDURE — 80053 COMPREHEN METABOLIC PANEL: CPT

## 2019-09-12 PROCEDURE — 6370000000 HC RX 637 (ALT 250 FOR IP): Performed by: PHYSICIAN ASSISTANT

## 2019-09-12 PROCEDURE — 94760 N-INVAS EAR/PLS OXIMETRY 1: CPT

## 2019-09-12 PROCEDURE — 85025 COMPLETE CBC W/AUTO DIFF WBC: CPT

## 2019-09-12 PROCEDURE — 2580000003 HC RX 258: Performed by: INTERNAL MEDICINE

## 2019-09-12 PROCEDURE — 84100 ASSAY OF PHOSPHORUS: CPT

## 2019-09-12 PROCEDURE — G0378 HOSPITAL OBSERVATION PER HR: HCPCS

## 2019-09-12 RX ADMIN — Medication 10 ML: at 07:57

## 2019-09-12 RX ADMIN — Medication 100 MG: at 09:18

## 2019-09-12 RX ADMIN — ENOXAPARIN SODIUM 40 MG: 40 INJECTION SUBCUTANEOUS at 07:57

## 2019-09-12 RX ADMIN — THERA TABS 1 TABLET: TAB at 07:57

## 2019-09-12 RX ADMIN — Medication 10 ML: at 22:16

## 2019-09-12 RX ADMIN — LORAZEPAM 1 MG: 1 TABLET ORAL at 09:18

## 2019-09-12 RX ADMIN — LORAZEPAM 1 MG: 1 TABLET ORAL at 23:57

## 2019-09-12 RX ADMIN — FOLIC ACID 1 MG: 1 TABLET ORAL at 07:57

## 2019-09-12 RX ADMIN — LORAZEPAM 1 MG: 1 TABLET ORAL at 18:06

## 2019-09-12 RX ADMIN — LORAZEPAM 1 MG: 1 TABLET ORAL at 07:57

## 2019-09-12 RX ADMIN — ACETAMINOPHEN 650 MG: 325 TABLET, FILM COATED ORAL at 21:20

## 2019-09-12 ASSESSMENT — PAIN SCALES - GENERAL
PAINLEVEL_OUTOF10: 0
PAINLEVEL_OUTOF10: 8
PAINLEVEL_OUTOF10: 8

## 2019-09-12 NOTE — CARE COORDINATION
Discharge Planning Assessment  RN/SW discharge planner met with patient to discuss reason for admission, current living situation, and potential needs at the time of discharge    Demographics/Insurance verified Yes/yes    Current type of dwelling: duplex    Patient from ECF/SW confirmed with: n/a    Living arrangements: living with sister, stairs to second floor    Level of function/Support: Independent     PCP: [facility]    Last Visit to PCP: 2 months ago    DME: pt states, \"used to have cane but cannot find it. \"    Active with any community resources/agencies/skilled home care: [facility] with [facility] but was kicked out of living situation about a month ago because of alcoholism     Medication compliance issues: yes- non compliant     Financial issues that could impact healthcare:  States he is waiting on SSI to start back up because he was recently in retirement for trespassing     Transportation at the time of discharge: No    Tentative discharge plan: Pt would like to go to [facility] for rehab. Voicemail to admissions [phone] for referral process.       Yessica Ross MSW, [address]

## 2019-09-13 PROCEDURE — 94760 N-INVAS EAR/PLS OXIMETRY 1: CPT

## 2019-09-13 PROCEDURE — 6360000002 HC RX W HCPCS: Performed by: INTERNAL MEDICINE

## 2019-09-13 PROCEDURE — G0378 HOSPITAL OBSERVATION PER HR: HCPCS

## 2019-09-13 PROCEDURE — 2580000003 HC RX 258: Performed by: INTERNAL MEDICINE

## 2019-09-13 PROCEDURE — APPNB30 APP NON BILLABLE TIME 0-30 MINS: Performed by: NURSE PRACTITIONER

## 2019-09-13 PROCEDURE — 6370000000 HC RX 637 (ALT 250 FOR IP): Performed by: INTERNAL MEDICINE

## 2019-09-13 PROCEDURE — 96372 THER/PROPH/DIAG INJ SC/IM: CPT

## 2019-09-13 PROCEDURE — 6370000000 HC RX 637 (ALT 250 FOR IP): Performed by: PHYSICIAN ASSISTANT

## 2019-09-13 RX ADMIN — THERA TABS 1 TABLET: TAB at 09:51

## 2019-09-13 RX ADMIN — ENOXAPARIN SODIUM 40 MG: 40 INJECTION SUBCUTANEOUS at 09:55

## 2019-09-13 RX ADMIN — Medication 100 MG: at 09:51

## 2019-09-13 RX ADMIN — FOLIC ACID 1 MG: 1 TABLET ORAL at 09:51

## 2019-09-13 RX ADMIN — Medication 10 ML: at 19:59

## 2019-09-13 RX ADMIN — LORAZEPAM 1 MG: 1 TABLET ORAL at 06:21

## 2019-09-13 RX ADMIN — ACETAMINOPHEN 650 MG: 325 TABLET, FILM COATED ORAL at 15:51

## 2019-09-13 ASSESSMENT — PAIN DESCRIPTION - LOCATION
LOCATION: GENERALIZED;HEAD
LOCATION: GENERALIZED;HEAD

## 2019-09-13 ASSESSMENT — PAIN SCALES - GENERAL
PAINLEVEL_OUTOF10: 6
PAINLEVEL_OUTOF10: 8

## 2019-09-13 ASSESSMENT — PAIN DESCRIPTION - PAIN TYPE
TYPE: ACUTE PAIN
TYPE: ACUTE PAIN

## 2019-09-13 ASSESSMENT — PAIN DESCRIPTION - DESCRIPTORS: DESCRIPTORS: HEADACHE;ACHING

## 2019-09-14 PROBLEM — E66.9 OBESITY (BMI 30-39.9): Status: ACTIVE | Noted: 2019-09-14

## 2019-09-14 PROBLEM — R07.89 ATYPICAL CHEST PAIN: Status: ACTIVE | Noted: 2019-09-14

## 2019-09-14 PROBLEM — F10.930 ALCOHOL WITHDRAWAL, UNCOMPLICATED (HCC): Status: ACTIVE | Noted: 2019-09-14

## 2019-09-14 PROBLEM — F20.0 PARANOID SCHIZOPHRENIA (HCC): Status: ACTIVE | Noted: 2019-09-14

## 2019-09-14 PROBLEM — F31.9 BIPOLAR 1 DISORDER (HCC): Status: ACTIVE | Noted: 2019-09-14

## 2019-09-14 PROBLEM — K29.20 CHRONIC ALCOHOLIC GASTRITIS WITHOUT HEMORRHAGE: Status: ACTIVE | Noted: 2019-09-14

## 2019-09-14 LAB
ANION GAP SERPL CALCULATED.3IONS-SCNC: 10 MMOL/L (ref 3–16)
BASOPHILS ABSOLUTE: 0.1 K/UL (ref 0–0.2)
BASOPHILS RELATIVE PERCENT: 0.9 %
BUN BLDV-MCNC: 15 MG/DL (ref 7–20)
CALCIUM SERPL-MCNC: 9.6 MG/DL (ref 8.3–10.6)
CHLORIDE BLD-SCNC: 104 MMOL/L (ref 99–110)
CO2: 22 MMOL/L (ref 21–32)
CREAT SERPL-MCNC: 0.8 MG/DL (ref 0.9–1.3)
EOSINOPHILS ABSOLUTE: 0.3 K/UL (ref 0–0.6)
EOSINOPHILS RELATIVE PERCENT: 5.2 %
GFR AFRICAN AMERICAN: >60
GFR NON-AFRICAN AMERICAN: >60
GLUCOSE BLD-MCNC: 104 MG/DL (ref 70–99)
HCT VFR BLD CALC: 43.2 % (ref 40.5–52.5)
HEMOGLOBIN: 14.4 G/DL (ref 13.5–17.5)
LYMPHOCYTES ABSOLUTE: 1.5 K/UL (ref 1–5.1)
LYMPHOCYTES RELATIVE PERCENT: 22.6 %
MAGNESIUM: 2.2 MG/DL (ref 1.8–2.4)
MCH RBC QN AUTO: 28.7 PG (ref 26–34)
MCHC RBC AUTO-ENTMCNC: 33.4 G/DL (ref 31–36)
MCV RBC AUTO: 85.8 FL (ref 80–100)
MONOCYTES ABSOLUTE: 0.8 K/UL (ref 0–1.3)
MONOCYTES RELATIVE PERCENT: 12.5 %
NEUTROPHILS ABSOLUTE: 3.8 K/UL (ref 1.7–7.7)
NEUTROPHILS RELATIVE PERCENT: 58.8 %
PDW BLD-RTO: 15.9 % (ref 12.4–15.4)
PHOSPHORUS: 3.7 MG/DL (ref 2.5–4.9)
PLATELET # BLD: 164 K/UL (ref 135–450)
PMV BLD AUTO: 8 FL (ref 5–10.5)
POTASSIUM SERPL-SCNC: 4.4 MMOL/L (ref 3.5–5.1)
RBC # BLD: 5.03 M/UL (ref 4.2–5.9)
SODIUM BLD-SCNC: 136 MMOL/L (ref 136–145)
WBC # BLD: 6.5 K/UL (ref 4–11)

## 2019-09-14 PROCEDURE — 2580000003 HC RX 258: Performed by: INTERNAL MEDICINE

## 2019-09-14 PROCEDURE — 6360000002 HC RX W HCPCS: Performed by: INTERNAL MEDICINE

## 2019-09-14 PROCEDURE — 36415 COLL VENOUS BLD VENIPUNCTURE: CPT

## 2019-09-14 PROCEDURE — 80048 BASIC METABOLIC PNL TOTAL CA: CPT

## 2019-09-14 PROCEDURE — 6370000000 HC RX 637 (ALT 250 FOR IP): Performed by: PHYSICIAN ASSISTANT

## 2019-09-14 PROCEDURE — 6370000000 HC RX 637 (ALT 250 FOR IP): Performed by: INTERNAL MEDICINE

## 2019-09-14 PROCEDURE — 84100 ASSAY OF PHOSPHORUS: CPT

## 2019-09-14 PROCEDURE — 85025 COMPLETE CBC W/AUTO DIFF WBC: CPT

## 2019-09-14 PROCEDURE — 1200000000 HC SEMI PRIVATE

## 2019-09-14 PROCEDURE — G0378 HOSPITAL OBSERVATION PER HR: HCPCS

## 2019-09-14 PROCEDURE — 83735 ASSAY OF MAGNESIUM: CPT

## 2019-09-14 PROCEDURE — 96372 THER/PROPH/DIAG INJ SC/IM: CPT

## 2019-09-14 RX ORDER — PANTOPRAZOLE SODIUM 40 MG/1
40 TABLET, DELAYED RELEASE ORAL
Status: DISCONTINUED | OUTPATIENT
Start: 2019-09-14 | End: 2019-09-16 | Stop reason: HOSPADM

## 2019-09-14 RX ORDER — BUSPIRONE HYDROCHLORIDE 5 MG/1
30 TABLET ORAL 2 TIMES DAILY
Status: DISCONTINUED | OUTPATIENT
Start: 2019-09-14 | End: 2019-09-16 | Stop reason: HOSPADM

## 2019-09-14 RX ORDER — OLANZAPINE 5 MG/1
20 TABLET ORAL NIGHTLY
Status: DISCONTINUED | OUTPATIENT
Start: 2019-09-14 | End: 2019-09-16 | Stop reason: HOSPADM

## 2019-09-14 RX ORDER — MAGNESIUM HYDROXIDE/ALUMINUM HYDROXICE/SIMETHICONE 120; 1200; 1200 MG/30ML; MG/30ML; MG/30ML
30 SUSPENSION ORAL EVERY 6 HOURS PRN
Status: DISCONTINUED | OUTPATIENT
Start: 2019-09-14 | End: 2019-09-16 | Stop reason: HOSPADM

## 2019-09-14 RX ORDER — SODIUM CHLORIDE 9 MG/ML
INJECTION, SOLUTION INTRAVENOUS CONTINUOUS
Status: DISCONTINUED | OUTPATIENT
Start: 2019-09-14 | End: 2019-09-16 | Stop reason: HOSPADM

## 2019-09-14 RX ORDER — SUCRALFATE 1 G/1
1 TABLET ORAL EVERY 6 HOURS SCHEDULED
Status: DISCONTINUED | OUTPATIENT
Start: 2019-09-14 | End: 2019-09-16 | Stop reason: HOSPADM

## 2019-09-14 RX ADMIN — THERA TABS 1 TABLET: TAB at 09:17

## 2019-09-14 RX ADMIN — SUCRALFATE 1 G: 1 TABLET ORAL at 23:10

## 2019-09-14 RX ADMIN — LORAZEPAM 1 MG: 1 TABLET ORAL at 09:17

## 2019-09-14 RX ADMIN — SUCRALFATE 1 G: 1 TABLET ORAL at 17:21

## 2019-09-14 RX ADMIN — FOLIC ACID 1 MG: 1 TABLET ORAL at 09:17

## 2019-09-14 RX ADMIN — BUSPIRONE HYDROCHLORIDE 30 MG: 5 TABLET ORAL at 12:51

## 2019-09-14 RX ADMIN — OLANZAPINE 20 MG: 5 TABLET, FILM COATED ORAL at 20:47

## 2019-09-14 RX ADMIN — Medication 10 ML: at 20:48

## 2019-09-14 RX ADMIN — Medication 100 MG: at 12:52

## 2019-09-14 RX ADMIN — ENOXAPARIN SODIUM 40 MG: 40 INJECTION SUBCUTANEOUS at 09:17

## 2019-09-14 RX ADMIN — PANTOPRAZOLE SODIUM 40 MG: 40 TABLET, DELAYED RELEASE ORAL at 17:21

## 2019-09-14 RX ADMIN — SUCRALFATE 1 G: 1 TABLET ORAL at 12:51

## 2019-09-14 RX ADMIN — SODIUM CHLORIDE: 9 INJECTION, SOLUTION INTRAVENOUS at 12:51

## 2019-09-14 RX ADMIN — BUSPIRONE HYDROCHLORIDE 30 MG: 5 TABLET ORAL at 20:47

## 2019-09-14 ASSESSMENT — PAIN SCALES - GENERAL
PAINLEVEL_OUTOF10: 3
PAINLEVEL_OUTOF10: 0

## 2019-09-15 LAB
ANION GAP SERPL CALCULATED.3IONS-SCNC: 12 MMOL/L (ref 3–16)
BASOPHILS ABSOLUTE: 0.1 K/UL (ref 0–0.2)
BASOPHILS RELATIVE PERCENT: 1.2 %
BUN BLDV-MCNC: 18 MG/DL (ref 7–20)
CALCIUM SERPL-MCNC: 9.7 MG/DL (ref 8.3–10.6)
CHLORIDE BLD-SCNC: 105 MMOL/L (ref 99–110)
CO2: 24 MMOL/L (ref 21–32)
CREAT SERPL-MCNC: 0.7 MG/DL (ref 0.9–1.3)
EOSINOPHILS ABSOLUTE: 0.3 K/UL (ref 0–0.6)
EOSINOPHILS RELATIVE PERCENT: 5.5 %
GFR AFRICAN AMERICAN: >60
GFR NON-AFRICAN AMERICAN: >60
GLUCOSE BLD-MCNC: 119 MG/DL (ref 70–99)
HCT VFR BLD CALC: 45.8 % (ref 40.5–52.5)
HEMOGLOBIN: 15.2 G/DL (ref 13.5–17.5)
LYMPHOCYTES ABSOLUTE: 1.5 K/UL (ref 1–5.1)
LYMPHOCYTES RELATIVE PERCENT: 24.2 %
MAGNESIUM: 2.4 MG/DL (ref 1.8–2.4)
MCH RBC QN AUTO: 28.9 PG (ref 26–34)
MCHC RBC AUTO-ENTMCNC: 33.3 G/DL (ref 31–36)
MCV RBC AUTO: 86.7 FL (ref 80–100)
MONOCYTES ABSOLUTE: 0.9 K/UL (ref 0–1.3)
MONOCYTES RELATIVE PERCENT: 14.1 %
NEUTROPHILS ABSOLUTE: 3.4 K/UL (ref 1.7–7.7)
NEUTROPHILS RELATIVE PERCENT: 55 %
PDW BLD-RTO: 16.1 % (ref 12.4–15.4)
PHOSPHORUS: 3.8 MG/DL (ref 2.5–4.9)
PLATELET # BLD: 166 K/UL (ref 135–450)
PMV BLD AUTO: 8 FL (ref 5–10.5)
POTASSIUM SERPL-SCNC: 4.2 MMOL/L (ref 3.5–5.1)
RBC # BLD: 5.28 M/UL (ref 4.2–5.9)
SODIUM BLD-SCNC: 141 MMOL/L (ref 136–145)
WBC # BLD: 6.2 K/UL (ref 4–11)

## 2019-09-15 PROCEDURE — 6360000002 HC RX W HCPCS: Performed by: INTERNAL MEDICINE

## 2019-09-15 PROCEDURE — 6370000000 HC RX 637 (ALT 250 FOR IP): Performed by: INTERNAL MEDICINE

## 2019-09-15 PROCEDURE — 84100 ASSAY OF PHOSPHORUS: CPT

## 2019-09-15 PROCEDURE — 83735 ASSAY OF MAGNESIUM: CPT

## 2019-09-15 PROCEDURE — 1200000000 HC SEMI PRIVATE

## 2019-09-15 PROCEDURE — 80048 BASIC METABOLIC PNL TOTAL CA: CPT

## 2019-09-15 PROCEDURE — 36415 COLL VENOUS BLD VENIPUNCTURE: CPT

## 2019-09-15 PROCEDURE — 94760 N-INVAS EAR/PLS OXIMETRY 1: CPT

## 2019-09-15 PROCEDURE — 2580000003 HC RX 258: Performed by: INTERNAL MEDICINE

## 2019-09-15 PROCEDURE — 85025 COMPLETE CBC W/AUTO DIFF WBC: CPT

## 2019-09-15 RX ORDER — BUSPIRONE HYDROCHLORIDE 5 MG/1
TABLET ORAL
Status: DISPENSED
Start: 2019-09-15 | End: 2019-09-15

## 2019-09-15 RX ADMIN — Medication 100 MG: at 09:00

## 2019-09-15 RX ADMIN — PANTOPRAZOLE SODIUM 40 MG: 40 TABLET, DELAYED RELEASE ORAL at 16:24

## 2019-09-15 RX ADMIN — BUSPIRONE HYDROCHLORIDE 30 MG: 5 TABLET ORAL at 09:15

## 2019-09-15 RX ADMIN — SUCRALFATE 1 G: 1 TABLET ORAL at 06:00

## 2019-09-15 RX ADMIN — OLANZAPINE 20 MG: 5 TABLET, FILM COATED ORAL at 23:52

## 2019-09-15 RX ADMIN — THERA TABS 1 TABLET: TAB at 09:00

## 2019-09-15 RX ADMIN — SUCRALFATE 1 G: 1 TABLET ORAL at 17:38

## 2019-09-15 RX ADMIN — SUCRALFATE 1 G: 1 TABLET ORAL at 23:53

## 2019-09-15 RX ADMIN — BUSPIRONE HYDROCHLORIDE 30 MG: 5 TABLET ORAL at 23:51

## 2019-09-15 RX ADMIN — SUCRALFATE 1 G: 1 TABLET ORAL at 10:44

## 2019-09-15 RX ADMIN — ENOXAPARIN SODIUM 40 MG: 40 INJECTION SUBCUTANEOUS at 09:00

## 2019-09-15 RX ADMIN — FOLIC ACID 1 MG: 1 TABLET ORAL at 09:00

## 2019-09-15 RX ADMIN — Medication 10 ML: at 23:52

## 2019-09-15 RX ADMIN — Medication 10 ML: at 09:00

## 2019-09-15 RX ADMIN — PANTOPRAZOLE SODIUM 40 MG: 40 TABLET, DELAYED RELEASE ORAL at 06:00

## 2019-09-15 ASSESSMENT — PAIN SCALES - GENERAL
PAINLEVEL_OUTOF10: 6
PAINLEVEL_OUTOF10: 0

## 2019-09-15 ASSESSMENT — PAIN - FUNCTIONAL ASSESSMENT: PAIN_FUNCTIONAL_ASSESSMENT: ACTIVITIES ARE NOT PREVENTED

## 2019-09-15 ASSESSMENT — PAIN DESCRIPTION - PAIN TYPE: TYPE: ACUTE PAIN

## 2019-09-15 ASSESSMENT — PAIN DESCRIPTION - FREQUENCY: FREQUENCY: INTERMITTENT

## 2019-09-15 ASSESSMENT — PAIN DESCRIPTION - LOCATION: LOCATION: GENERALIZED

## 2019-09-15 ASSESSMENT — PAIN DESCRIPTION - ONSET: ONSET: ON-GOING

## 2019-09-15 ASSESSMENT — PAIN DESCRIPTION - DESCRIPTORS: DESCRIPTORS: ACHING

## 2019-09-16 VITALS
HEIGHT: 71 IN | RESPIRATION RATE: 16 BRPM | SYSTOLIC BLOOD PRESSURE: 144 MMHG | OXYGEN SATURATION: 98 % | HEART RATE: 93 BPM | BODY MASS INDEX: 30.69 KG/M2 | WEIGHT: 219.2 LBS | TEMPERATURE: 97.7 F | DIASTOLIC BLOOD PRESSURE: 89 MMHG

## 2019-09-16 LAB
ANION GAP SERPL CALCULATED.3IONS-SCNC: 13 MMOL/L (ref 3–16)
BASOPHILS ABSOLUTE: 0.1 K/UL (ref 0–0.2)
BASOPHILS RELATIVE PERCENT: 1.3 %
BUN BLDV-MCNC: 17 MG/DL (ref 7–20)
CALCIUM SERPL-MCNC: 9.6 MG/DL (ref 8.3–10.6)
CHLORIDE BLD-SCNC: 107 MMOL/L (ref 99–110)
CO2: 21 MMOL/L (ref 21–32)
CREAT SERPL-MCNC: 0.7 MG/DL (ref 0.9–1.3)
EOSINOPHILS ABSOLUTE: 0.3 K/UL (ref 0–0.6)
EOSINOPHILS RELATIVE PERCENT: 5.4 %
GFR AFRICAN AMERICAN: >60
GFR NON-AFRICAN AMERICAN: >60
GLUCOSE BLD-MCNC: 112 MG/DL (ref 70–99)
HCT VFR BLD CALC: 44.7 % (ref 40.5–52.5)
HEMOGLOBIN: 14.6 G/DL (ref 13.5–17.5)
LYMPHOCYTES ABSOLUTE: 1.5 K/UL (ref 1–5.1)
LYMPHOCYTES RELATIVE PERCENT: 26.9 %
MAGNESIUM: 2.2 MG/DL (ref 1.8–2.4)
MCH RBC QN AUTO: 28.8 PG (ref 26–34)
MCHC RBC AUTO-ENTMCNC: 32.8 G/DL (ref 31–36)
MCV RBC AUTO: 87.8 FL (ref 80–100)
MONOCYTES ABSOLUTE: 0.8 K/UL (ref 0–1.3)
MONOCYTES RELATIVE PERCENT: 14.4 %
NEUTROPHILS ABSOLUTE: 2.9 K/UL (ref 1.7–7.7)
NEUTROPHILS RELATIVE PERCENT: 52 %
PDW BLD-RTO: 15.9 % (ref 12.4–15.4)
PHOSPHORUS: 4.4 MG/DL (ref 2.5–4.9)
PLATELET # BLD: 157 K/UL (ref 135–450)
PMV BLD AUTO: 7.7 FL (ref 5–10.5)
POTASSIUM SERPL-SCNC: 4 MMOL/L (ref 3.5–5.1)
RBC # BLD: 5.09 M/UL (ref 4.2–5.9)
SODIUM BLD-SCNC: 141 MMOL/L (ref 136–145)
WBC # BLD: 5.7 K/UL (ref 4–11)

## 2019-09-16 PROCEDURE — 84100 ASSAY OF PHOSPHORUS: CPT

## 2019-09-16 PROCEDURE — 83735 ASSAY OF MAGNESIUM: CPT

## 2019-09-16 PROCEDURE — 2580000003 HC RX 258: Performed by: INTERNAL MEDICINE

## 2019-09-16 PROCEDURE — 80048 BASIC METABOLIC PNL TOTAL CA: CPT

## 2019-09-16 PROCEDURE — 6370000000 HC RX 637 (ALT 250 FOR IP): Performed by: INTERNAL MEDICINE

## 2019-09-16 PROCEDURE — 85025 COMPLETE CBC W/AUTO DIFF WBC: CPT

## 2019-09-16 PROCEDURE — 6360000002 HC RX W HCPCS: Performed by: INTERNAL MEDICINE

## 2019-09-16 PROCEDURE — 36415 COLL VENOUS BLD VENIPUNCTURE: CPT

## 2019-09-16 RX ORDER — OMEPRAZOLE 20 MG/1
20 CAPSULE, DELAYED RELEASE ORAL 2 TIMES DAILY
Qty: 60 CAPSULE | Refills: 0 | Status: SHIPPED | OUTPATIENT
Start: 2019-09-16 | End: 2019-09-24

## 2019-09-16 RX ORDER — OLANZAPINE 20 MG/1
20 TABLET ORAL NIGHTLY
Qty: 30 TABLET | Refills: 0 | Status: SHIPPED | OUTPATIENT
Start: 2019-09-16 | End: 2019-09-24

## 2019-09-16 RX ORDER — MULTIVITAMIN WITH FOLIC ACID 400 MCG
1 TABLET ORAL DAILY
Qty: 30 TABLET | Refills: 0 | Status: SHIPPED | OUTPATIENT
Start: 2019-09-17 | End: 2019-09-24

## 2019-09-16 RX ORDER — SUCRALFATE 1 G/1
1 TABLET ORAL 4 TIMES DAILY
Qty: 28 TABLET | Refills: 0 | Status: ON HOLD | OUTPATIENT
Start: 2019-09-16 | End: 2021-06-28

## 2019-09-16 RX ORDER — BUSPIRONE HYDROCHLORIDE 30 MG/1
30 TABLET ORAL 2 TIMES DAILY
Qty: 60 TABLET | Refills: 0 | Status: ON HOLD | OUTPATIENT
Start: 2019-09-16 | End: 2021-06-28

## 2019-09-16 RX ORDER — LANOLIN ALCOHOL/MO/W.PET/CERES
100 CREAM (GRAM) TOPICAL DAILY
Qty: 30 TABLET | Refills: 0 | Status: SHIPPED | OUTPATIENT
Start: 2019-09-17 | End: 2019-09-24

## 2019-09-16 RX ORDER — FOLIC ACID 1 MG/1
1 TABLET ORAL DAILY
Qty: 30 TABLET | Refills: 0 | Status: SHIPPED | OUTPATIENT
Start: 2019-09-17 | End: 2019-09-24

## 2019-09-16 RX ADMIN — THERA TABS 1 TABLET: TAB at 08:51

## 2019-09-16 RX ADMIN — FOLIC ACID 1 MG: 1 TABLET ORAL at 08:51

## 2019-09-16 RX ADMIN — Medication 100 MG: at 09:51

## 2019-09-16 RX ADMIN — PANTOPRAZOLE SODIUM 40 MG: 40 TABLET, DELAYED RELEASE ORAL at 06:42

## 2019-09-16 RX ADMIN — SODIUM CHLORIDE: 9 INJECTION, SOLUTION INTRAVENOUS at 04:34

## 2019-09-16 RX ADMIN — ENOXAPARIN SODIUM 40 MG: 40 INJECTION SUBCUTANEOUS at 08:51

## 2019-09-16 RX ADMIN — SUCRALFATE 1 G: 1 TABLET ORAL at 06:42

## 2019-09-16 RX ADMIN — BUSPIRONE HYDROCHLORIDE 30 MG: 5 TABLET ORAL at 09:53

## 2019-09-16 RX ADMIN — ACETAMINOPHEN 650 MG: 325 TABLET, FILM COATED ORAL at 08:51

## 2019-09-16 ASSESSMENT — PAIN SCALES - GENERAL
PAINLEVEL_OUTOF10: 6

## 2019-09-16 ASSESSMENT — PAIN DESCRIPTION - PAIN TYPE: TYPE: ACUTE PAIN

## 2019-09-16 ASSESSMENT — PAIN DESCRIPTION - LOCATION: LOCATION: GENERALIZED

## 2019-09-16 NOTE — DISCHARGE SUMMARY
Hospital Medicine Discharge Summary    Patient: Stephen Simons     Gender: male  : 1970   Age: 57 y.o. MRN: [id number]    Admitting Physician: Kimberly Alamo MD  Discharge Physician: Kimberly Alamo MD     Code Status: Full Code     Admit Date: 2019   Discharge Date:   19    Disposition:  Home    Discharge Diagnoses: Active Hospital Problems    Diagnosis Date Noted    Paranoid schizophrenia ([facility] [F20.0] 2019    Bipolar 1 disorder ([facility] [F31.9] 2019    Chronic alcoholic gastritis without hemorrhage [K29.20] 2019    Atypical chest pain [R07.89] 2019    Alcohol withdrawal, uncomplicated (HCC) [B43.032] 2019    Obesity (BMI 30-39. 9) [E66.9] 2019    S/P repair of ventral hernia [Z98.890, Z87.19]     Alcohol intoxication ([facility] [F10.929] 2019    Alcohol intoxication delirium ([facility] [F10.121] 2019       Follow-up appointments:  one week    Outpatient to do list: Find new PCP in 1 week    Condition at Discharge:  Stable    Hospital Course:   56 yo M with bipolar 1 disorder, paranoid schizophrenia, homeless, continuous alcohol abuse, tobacco abuse, s/p recent ventral hernia repair came to ER with EtOH WD. Placed in observation. Has atypical CP secondary to alcoholic gastritis. Changed to inpatient status for EtOH WD on 19. Patient treated with IVF, CIWA Ativan, Banana bags. Resumed on Zyprexa and Buspar. Has no family. Homeless. Treated aggressively with Carafate and Protonix. Will complete course of PO Carafate and Omeprazole for alcoholic gastritis. SW has arranged Lyft for patient to go [facility] [place] for alcohol rehab assessment. Given PCP list prior to DC.     Discharge Medications:   Current Discharge Medication List      START taking these medications    Details   busPIRone (BUSPAR) 30 MG tablet Take 30 mg by mouth 2 times daily  Qty: 60 tablet, Refills: 0      OLANZapine (ZYPREXA) 20 MG tablet Take 1 tablet by mouth nightly  Qty: 30 tablet, Refills: 0      folic acid (FOLVITE) 1 MG tablet Take 1 tablet by mouth daily  Qty: 30 tablet, Refills: 0      Multiple Vitamin (MULTIVITAMIN) tablet Take 1 tablet by mouth daily  Qty: 30 tablet, Refills: 0      sucralfate (CARAFATE) 1 GM tablet Take 1 tablet by mouth 4 times daily for 7 days  Qty: 28 tablet, Refills: 0      vitamin B-1 100 MG tablet Take 1 tablet by mouth daily  Qty: 30 tablet, Refills: 0      omeprazole (PRILOSEC) 20 MG delayed release capsule Take 1 capsule by mouth 2 times daily  Qty: 60 capsule, Refills: 0           Current Discharge Medication List        Current Discharge Medication List        Current Discharge Medication List            Discharge Exam:    BP (!) 144/89   Pulse 93   Temp 97.7 °F (36.5 °C) (Temporal)   Resp 16   Ht 5' 11\" (1.803 m)   Wt 219 lb 3.2 oz (99.4 kg)   SpO2 98%   BMI 30.57 kg/m²   General appearance:  Appears comfortable  Eyes: Sclera clear. Pupils equal.  ENT: Moist oral mucosa. Trachea midline, no adenopathy. Cardiovascular: Regular rhythm, normal S1, S2. No murmur. No edema in lower extremities  Respiratory: Not using accessory muscles. Good inspiratory effort. Clear to auscultation bilaterally, no wheeze or crackles. GI: Abdomen soft, no tenderness, not distended, normal bowel sounds  Musculoskeletal: No cyanosis in digits, neck supple  Neurology: Grossly intact. No speech or motor deficits  Psych: Normal affect. Alert and oriented in time, place and person  Skin: Warm, dry, normal turgor  Extremity exam shows brisk capillary refill. Peripheral pulses are palpable in lower extremities     Labs:  For convenience and continuity at follow-up the following most recent labs are provided:    Lab Results   Component Value Date    WBC 5.7 09/16/2019    HGB 14.6 09/16/2019    HCT 44.7 09/16/2019    MCV 87.8 09/16/2019     09/16/2019     09/16/2019    K 4.0 09/16/2019    K 4.1 09/12/2019     09/16/2019    CO2 21

## 2019-09-16 NOTE — PROGRESS NOTES
4 Eyes Skin Assessment     The patient is being assess for  Admission    I agree that 2 RN's have performed a thorough Head to Toe Skin Assessment on the patient. ALL assessment sites listed below have been assessed. Areas assessed by both nurses: Bobbi Patel RN and ANDREA Mandel  [x]   Head, Face, and Ears   [x]   Shoulders, Back, and Chest  [x]   Arms, Elbows, and Hands   [x]   Coccyx, Sacrum, and IschIum  [x]   Legs, Feet, and Heels        Does the Patient have Skin Breakdown?   No         Héctor Prevention initiated:  NA   Wound Care Orders initiated:  NA      [facility] nurse consulted for Pressure Injury (Stage 3,4, Unstageable, DTI, NWPT, and Complex wounds), New and Established Ostomies:  NA      Nurse 1 eSignature: Electronically signed by Ale Jacome RN on 9/11/19 at 8:16 PM    **SHARE this note so that the co-signing nurse is able to place an eSignature**    Nurse 2 eSignature: Electronically signed by Eliceo Patel RN on 9/12/19 at 3:06 AM
Advanced Care Planning Note. Purpose of Encounter: Advanced care planning in light of alcohol withdrawal  Parties In Attendance: Patient  Decisional Capacity: Yes  Subjective: Patient with CP  Objective: Cr 0.8  Goals of Care Determination: Patient wants full support (CPR, vent, surgery, HD, trach, PEG)  Plan:  IVF. PPI, MVI, FA, Thiamine  Code Status: Full code   Time spent on Advanced care Plannin minutes  Advanced Care Planning Documents: Completed advanced directives on chart, son is the POA.     Monse Yang MD  2019 12:19 PM
CLINICAL PHARMACY NOTE: MEDS TO [address] Select Patient?: No  Total # of Prescriptions Filled: 4   The following medications were delivered to the patient:  · Omeprazole 20mg  · Buspirone 50WJ  · Folic acid 1mg  · sucralafate 1gm  Total # of Interventions Completed: 2  Time Spent (min): 45    Additional Documentation:  Patient declined Vitamin B1 and daily vitamin- scripts in bag  Olanzapine not in stock- script in bag  Buspirone 30mg not in stock, switch to 2x15mg tablet bid  Medications delivered- patient signed  Rocael Gamboa
Lorazepam given per order for CIWA=9, will continue to monitor pt.
PM assessment complete and documented. Meds given per MAR. CIWA scale done per policy. Score noted. Medicated with ativan per policy. Will reassess in 1 hr. No needs expressed. Will continue to monitor.
Pt CIWA score is an 8. 1 mg of ativan given per CIWA protocol, see MAR.
Pt's EMY is a 4. Pt told this RN that he is homeless. I will notify case management of this information.
Sent Dr. Nate Mckeon and FYI perfect serve message as followed:    Pt states he had hernia repair three weeks ago. Pt still has sutures in umbilical site. Patient asking about removing them. I asked patient if he was supposed to follow up with surgeon who did surgery. He stated he can't ever find a ride.
40 mg Daily   acetaminophen (TYLENOL) tablet 650 mg Q4H PRN       Objective:  /79   Pulse 91   Temp 98.1 °F (36.7 °C) (Temporal)   Resp 16   Ht 5' 11\" (1.803 m)   Wt 216 lb 4.8 oz (98.1 kg)   SpO2 97%   BMI 30.17 kg/m²     Intake/Output Summary (Last 24 hours) at 9/14/2019 1515  Last data filed at 9/14/2019 1256  Gross per 24 hour   Intake 620 ml   Output --   Net 620 ml      Wt Readings from Last 3 Encounters:   09/14/19 216 lb 4.8 oz (98.1 kg)   03/30/19 230 lb (104.3 kg)   03/27/19 230 lb (104.3 kg)       General appearance:  Appears comfortable  Eyes: Sclera clear. Pupils equal.  ENT: Moist oral mucosa. Trachea midline, no adenopathy. Cardiovascular: Regular rhythm, normal S1, S2. No murmur. No edema in lower extremities  Respiratory: Not using accessory muscles. Good inspiratory effort. Clear to auscultation bilaterally, no wheeze or crackles. GI: Abdomen soft, no tenderness, not distended, normal bowel sounds  Musculoskeletal: No cyanosis in digits, neck supple  Neurology: Grossly intact. No speech or motor deficits  Psych: Normal affect. Alert and oriented in time, place and person  Skin: Warm, dry, normal turgor  Extremity exam shows brisk capillary refill.   Peripheral pulses are palpable in lower extremities     Labs and Tests:  CBC:   Recent Labs     09/12/19  0444 09/14/19  1222   WBC 3.9* 6.5   HGB 14.7 14.4    164     BMP:    Recent Labs     09/12/19  0444 09/14/19  1222    136   K 4.1 4.4    104   CO2 24 22   BUN 9 15   CREATININE 0.7* 0.8*   GLUCOSE 111* 104*     Hepatic:   Recent Labs     09/12/19  0444   AST 21   ALT 11   BILITOT 0.4   ALKPHOS 120     No orders to display         Problem List  Principal Problem:    Alcohol withdrawal, uncomplicated ([facility]  Active Problems:    Alcohol intoxication ([facility]    Alcohol intoxication delirium ([facility]    S/P repair of ventral hernia    Paranoid schizophrenia ([facility]    Bipolar 1 disorder ([address]    Chronic alcoholic gastritis
chest pain    Obesity (BMI 30-39. 9)  Resolved Problems:    * No resolved hospital problems. *       Assessment & Plan:   1. IVF continued  2. Cont Carafate AC/qhs  3. Start Protonix 40 mg PO bid  4. Cont MVI, FA, Thiamine  5. Resumed Buspar 30 mg PO bid  6. Resumed Zyprexa 20 mg qhs  7. Will need to fill meds tomorrow and arrange Lyft to rehab tomorrow    IV Access: Peripheral   Hanson: No  Diet: DIET GENERAL;  Code:Full Code  DVT PPX Lovenox  Disposition Alcohol rehab    Discussed with patient and nursing. Meds to be filled and Lyft to rehab tomorrow.       Gene Malone MD   9/15/2019 10:55 AM
protocol has already been started in the emergency room, continue.     DVT Prophylaxis: lovenox  Diet: DIET GENERAL;  Code Status: Full Code    PT/OT Eval Status:n/a     Dispo - ipt    Luciana Hernandez MD

## 2019-09-16 NOTE — DISCHARGE INSTR - COC
IV ACCESS:311709523}    Nursing Mobility/ADLs:  Walking   {CHP DME CYCJ:734682758}  Transfer  {CHP DME ZSVN:920126884}  Bathing  {CHP DME XQOZ:388337023}  Dressing  {CHP DME XLCH:066896378}  Toileting  {CHP DME LMFQ:198730666}  Feeding  {P DME RCSY:484387778}  Med Admin  {P DME OKTB:486142662}  Med Delivery   { JANES MED Delivery:506199032}    Wound Care Documentation and Therapy:        Elimination:  Continence:   · Bowel: {YES / JI:42722}  · Bladder: {YES / CK:05684}  Urinary Catheter: {Urinary Catheter:218112502}   Colostomy/Ileostomy/Ileal Conduit: {YES / LV:25466}       Date of Last BM: ***    Intake/Output Summary (Last 24 hours) at 2019 1046  Last data filed at 2019 0435  Gross per 24 hour   Intake 3658 ml   Output 2200 ml   Net 1458 ml     I/O last 3 completed shifts: In: 8701 [P.O.:1720;  I.V.:1938]  Out: 2200 [Urine:2200]    Safety Concerns:     [address] Safety Concerns:343550597}    Impairments/Disabilities:      { JANES Impairments/Disabilities:314939866}    Nutrition Therapy:  Current Nutrition Therapy:   [address] [employer] Diet List:070192990}    Routes of Feeding: {[facility] DME Other Feedings:073944758}  Liquids: {Slp liquid thickness:15309}  Daily Fluid Restriction: {P DME Yes amt example:351321817}  Last Modified Barium Swallow with Video (Video Swallowing Test): {Done Not Done HNTF:417257105}    Treatments at the Time of Hospital Discharge:   Respiratory Treatments: ***  Oxygen Therapy:  {Therapy; copd oxygen:03592}  Ventilator:    { GERA Vent OME}    Rehab Therapies: {THERAPEUTIC INTERVENTION:9974761932}  Weight Bearing Status/Restrictions: [address] Leanne Hardik  Weight Bearin}  Other Medical Equipment (for information only, NOT a DME order):  {EQUIPMENT:107232105}  Other Treatments: ***    Patient's personal belongings (please select all that are sent with patient):  {STANLEY DME Belongings:435399480}    RN SIGNATURE:  {Esignature:217317824}    CASE MANAGEMENT/SOCIAL WORK SECTION    Inpatient Status Date: ***    Readmission Risk Assessment Score:  Readmission Risk              Risk of Unplanned Readmission:        27           Discharging to Facility/ Agency   · Name:   · Address:  · Phone:  · Fax:    Dialysis Facility (if applicable)   · Name:  · Address:  · Dialysis Schedule:  · Phone:  · Fax:    / signature: {Esignature:273969797}    PHYSICIAN SECTION    Prognosis: {Prognosis:2671560065}    Condition at Discharge: [address] [place] [address] Patient Condition:961226715}    Rehab Potential (if transferring to Rehab): {Prognosis:0329481722}    Recommended Labs or Other Treatments After Discharge: ***    Physician Certification: I certify the above information and transfer of Geovanna Knowles  is necessary for the continuing treatment of the diagnosis listed and that he requires {Admit to Appropriate Level of Care:18044} for {GREATER/LESS:282825147} 30 days.      Update Admission H&P: {CHP DME Changes in ZTKNJ:662140876}    PHYSICIAN SIGNATURE:  {Esignature:969074950}

## 2019-09-24 ENCOUNTER — HOSPITAL ENCOUNTER (EMERGENCY)
Age: 49
Discharge: HOME | End: 2019-09-24
Attending: EMERGENCY MEDICINE
Payer: COMMERCIAL

## 2019-09-24 VITALS
DIASTOLIC BLOOD PRESSURE: 94 MMHG | OXYGEN SATURATION: 98 % | SYSTOLIC BLOOD PRESSURE: 144 MMHG | BODY MASS INDEX: 30.8 KG/M2 | HEIGHT: 71 IN | WEIGHT: 220 LBS | RESPIRATION RATE: 22 BRPM | HEART RATE: 83 BPM | TEMPERATURE: 98.2 F

## 2019-09-24 DIAGNOSIS — Z59.00 HOMELESSNESS: ICD-10-CM

## 2019-09-24 DIAGNOSIS — F10.930 ALCOHOL WITHDRAWAL, UNCOMPLICATED (HCC): ICD-10-CM

## 2019-09-24 DIAGNOSIS — F10.20 ALCOHOLISM, CHRONIC (HCC): Primary | ICD-10-CM

## 2019-09-24 DIAGNOSIS — F20.9 SCHIZOPHRENIA, UNSPECIFIED TYPE (HCC): ICD-10-CM

## 2019-09-24 LAB
A/G RATIO: 1.1 (ref 1.1–2.2)
ACETAMINOPHEN LEVEL: <5 UG/ML (ref 10–30)
ALBUMIN SERPL-MCNC: 4.7 G/DL (ref 3.4–5)
ALP BLD-CCNC: 120 U/L (ref 40–129)
ALT SERPL-CCNC: 19 U/L (ref 10–40)
AMPHETAMINE SCREEN, URINE: ABNORMAL
ANION GAP SERPL CALCULATED.3IONS-SCNC: 14 MMOL/L (ref 3–16)
AST SERPL-CCNC: 29 U/L (ref 15–37)
BARBITURATE SCREEN URINE: ABNORMAL
BASOPHILS ABSOLUTE: 0.1 K/UL (ref 0–0.2)
BASOPHILS RELATIVE PERCENT: 1.4 %
BENZODIAZEPINE SCREEN, URINE: POSITIVE
BILIRUB SERPL-MCNC: 0.3 MG/DL (ref 0–1)
BUN BLDV-MCNC: 8 MG/DL (ref 7–20)
CALCIUM SERPL-MCNC: 9.2 MG/DL (ref 8.3–10.6)
CANNABINOID SCREEN URINE: POSITIVE
CHLORIDE BLD-SCNC: 103 MMOL/L (ref 99–110)
CO2: 22 MMOL/L (ref 21–32)
COCAINE METABOLITE SCREEN URINE: ABNORMAL
CREAT SERPL-MCNC: 0.8 MG/DL (ref 0.9–1.3)
EOSINOPHILS ABSOLUTE: 0.3 K/UL (ref 0–0.6)
EOSINOPHILS RELATIVE PERCENT: 4.6 %
ETHANOL: 92 MG/DL (ref 0–0.08)
GFR AFRICAN AMERICAN: >60
GFR NON-AFRICAN AMERICAN: >60
GLOBULIN: 4.1 G/DL
GLUCOSE BLD-MCNC: 91 MG/DL (ref 70–99)
HCT VFR BLD CALC: 42.1 % (ref 40.5–52.5)
HEMOGLOBIN: 14.5 G/DL (ref 13.5–17.5)
LIPASE: 17 U/L (ref 13–60)
LYMPHOCYTES ABSOLUTE: 2 K/UL (ref 1–5.1)
LYMPHOCYTES RELATIVE PERCENT: 36.5 %
Lab: ABNORMAL
MCH RBC QN AUTO: 28.9 PG (ref 26–34)
MCHC RBC AUTO-ENTMCNC: 34.3 G/DL (ref 31–36)
MCV RBC AUTO: 84.1 FL (ref 80–100)
METHADONE SCREEN, URINE: ABNORMAL
MONOCYTES ABSOLUTE: 0.5 K/UL (ref 0–1.3)
MONOCYTES RELATIVE PERCENT: 9 %
NEUTROPHILS ABSOLUTE: 2.7 K/UL (ref 1.7–7.7)
NEUTROPHILS RELATIVE PERCENT: 48.5 %
OPIATE SCREEN URINE: ABNORMAL
OXYCODONE URINE: ABNORMAL
PDW BLD-RTO: 16.2 % (ref 12.4–15.4)
PH UA: 5
PHENCYCLIDINE SCREEN URINE: ABNORMAL
PLATELET # BLD: 255 K/UL (ref 135–450)
PMV BLD AUTO: 7.4 FL (ref 5–10.5)
POTASSIUM SERPL-SCNC: 4.2 MMOL/L (ref 3.5–5.1)
PROPOXYPHENE SCREEN: ABNORMAL
RBC # BLD: 5.01 M/UL (ref 4.2–5.9)
SALICYLATE, SERUM: <0.3 MG/DL (ref 15–30)
SODIUM BLD-SCNC: 139 MMOL/L (ref 136–145)
TOTAL PROTEIN: 8.8 G/DL (ref 6.4–8.2)
WBC # BLD: 5.6 K/UL (ref 4–11)

## 2019-09-24 PROCEDURE — G0480 DRUG TEST DEF 1-7 CLASSES: HCPCS

## 2019-09-24 PROCEDURE — 36415 COLL VENOUS BLD VENIPUNCTURE: CPT

## 2019-09-24 PROCEDURE — 99284 EMERGENCY DEPT VISIT MOD MDM: CPT

## 2019-09-24 PROCEDURE — 2580000003 HC RX 258: Performed by: NURSE PRACTITIONER

## 2019-09-24 PROCEDURE — 6360000002 HC RX W HCPCS: Performed by: NURSE PRACTITIONER

## 2019-09-24 PROCEDURE — 80053 COMPREHEN METABOLIC PANEL: CPT

## 2019-09-24 PROCEDURE — 83690 ASSAY OF LIPASE: CPT

## 2019-09-24 PROCEDURE — 96365 THER/PROPH/DIAG IV INF INIT: CPT

## 2019-09-24 PROCEDURE — 85025 COMPLETE CBC W/AUTO DIFF WBC: CPT

## 2019-09-24 PROCEDURE — 6370000000 HC RX 637 (ALT 250 FOR IP): Performed by: NURSE PRACTITIONER

## 2019-09-24 PROCEDURE — 80307 DRUG TEST PRSMV CHEM ANLYZR: CPT

## 2019-09-24 PROCEDURE — 2500000003 HC RX 250 WO HCPCS: Performed by: NURSE PRACTITIONER

## 2019-09-24 PROCEDURE — 96368 THER/DIAG CONCURRENT INF: CPT

## 2019-09-24 RX ORDER — LORAZEPAM 2 MG/ML
1 INJECTION INTRAMUSCULAR
Status: DISCONTINUED | OUTPATIENT
Start: 2019-09-24 | End: 2019-09-24 | Stop reason: HOSPADM

## 2019-09-24 RX ORDER — SODIUM CHLORIDE 0.9 % (FLUSH) 0.9 %
10 SYRINGE (ML) INJECTION PRN
Status: DISCONTINUED | OUTPATIENT
Start: 2019-09-24 | End: 2019-09-24 | Stop reason: HOSPADM

## 2019-09-24 RX ORDER — SODIUM CHLORIDE 0.9 % (FLUSH) 0.9 %
10 SYRINGE (ML) INJECTION EVERY 12 HOURS SCHEDULED
Status: DISCONTINUED | OUTPATIENT
Start: 2019-09-24 | End: 2019-09-24 | Stop reason: HOSPADM

## 2019-09-24 RX ORDER — LORAZEPAM 1 MG/1
3 TABLET ORAL
Status: DISCONTINUED | OUTPATIENT
Start: 2019-09-24 | End: 2019-09-24 | Stop reason: HOSPADM

## 2019-09-24 RX ORDER — NICOTINE 21 MG/24HR
1 PATCH, TRANSDERMAL 24 HOURS TRANSDERMAL DAILY
Status: DISCONTINUED | OUTPATIENT
Start: 2019-09-24 | End: 2019-09-24 | Stop reason: HOSPADM

## 2019-09-24 RX ORDER — LORAZEPAM 1 MG/1
4 TABLET ORAL
Status: DISCONTINUED | OUTPATIENT
Start: 2019-09-24 | End: 2019-09-24 | Stop reason: HOSPADM

## 2019-09-24 RX ORDER — LORAZEPAM 2 MG/ML
4 INJECTION INTRAMUSCULAR
Status: DISCONTINUED | OUTPATIENT
Start: 2019-09-24 | End: 2019-09-24 | Stop reason: HOSPADM

## 2019-09-24 RX ORDER — LORAZEPAM 1 MG/1
2 TABLET ORAL
Status: DISCONTINUED | OUTPATIENT
Start: 2019-09-24 | End: 2019-09-24 | Stop reason: HOSPADM

## 2019-09-24 RX ORDER — LORAZEPAM 1 MG/1
1 TABLET ORAL
Status: DISCONTINUED | OUTPATIENT
Start: 2019-09-24 | End: 2019-09-24 | Stop reason: HOSPADM

## 2019-09-24 RX ORDER — LORAZEPAM 2 MG/ML
3 INJECTION INTRAMUSCULAR
Status: DISCONTINUED | OUTPATIENT
Start: 2019-09-24 | End: 2019-09-24 | Stop reason: HOSPADM

## 2019-09-24 RX ORDER — LORAZEPAM 2 MG/ML
2 INJECTION INTRAMUSCULAR
Status: DISCONTINUED | OUTPATIENT
Start: 2019-09-24 | End: 2019-09-24 | Stop reason: HOSPADM

## 2019-09-24 RX ADMIN — FOLIC ACID: 5 INJECTION, SOLUTION INTRAMUSCULAR; INTRAVENOUS; SUBCUTANEOUS at 16:50

## 2019-09-24 SDOH — ECONOMIC STABILITY - HOUSING INSECURITY: HOMELESSNESS UNSPECIFIED: Z59.00

## 2019-09-24 ASSESSMENT — ENCOUNTER SYMPTOMS
VOMITING: 0
DIARRHEA: 0
ABDOMINAL PAIN: 0
NAUSEA: 0
CHEST TIGHTNESS: 0
SHORTNESS OF BREATH: 0

## 2019-09-24 NOTE — ED NOTES
Pt states he has been drinking everyday since he was 19. Pt states he was here about 2 weeks ago and arrangements were made to go to [facility] and when he left here at 4pm they told him that they only admitted in the morning hours so pt just continued to drink alcohol. Pts  at the bedside states that pt has been wanting to be alcohol free for 6 months but that \"Everyone else keeps letting things fall through, there is no agency follow up. \" Clark Overton states that pt is homeless and has mental health issues as well as no transportation or phone and cannot do any follow up on his own. Clark Overton states this is the 4th time pt has tried this and that he needs inpatient care. Pt states he had his last drink at 0930 this morning, states he drank two 24oz beers.         December ANDREA Salas  09/24/19 7582
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
## 2019-09-25 NOTE — ED PROVIDER NOTES
temperature source Oral, resp. rate 22, height 5' 11\" (1.803 m), weight 220 lb (99.8 kg), SpO2 98 %. For further details of Kylee Mendez's emergency department encounter, please see documentation by advanced practice provider, Quyen Haynes NP.     Clemente Guillen DO (electronically signed)  Attending Emergency Physician       Clemente Guillen DO  09/24/19 6253
or not returned as of this dictation. EKG: All EKG's are interpreted by the Emergency Department Physician in the absence of a cardiologist.  Please see their note for interpretation of EKG. RADIOLOGY:   Non-plain film images such as CT, Ultrasound and MRI are read by the radiologist. Plain radiographic images are visualized andpreliminarily interpreted by the  ED Provider with the below findings:        Interpretation perthe Radiologist below, if available at the time of this note:    No orders to display     No results found. PROCEDURES   Unless otherwise noted below, none     Procedures    CRITICAL CARE TIME   N/A    CONSULTS:  IP CONSULT TO HOSPITALIST      EMERGENCY DEPARTMENT COURSE and DIFFERENTIAL DIAGNOSIS/MDM:   Vitals:    Vitals:    09/24/19 1630 09/24/19 1700 09/24/19 1730 09/24/19 1813   BP: (!) 141/86 (!) 128/98 (!) 138/92 (!) 144/94   Pulse: 83 82 81 83   Resp: 17 21 21 22   Temp:       TempSrc:       SpO2: 98% 98% 97% 98%   Weight:       Height:           Patient was given thefollowing medications:  Medications   sodium chloride 0.9 % 4,014 mL with folic acid 1 mg, adult multi-vitamin with vitamin k 10 mL, thiamine 100 mg ( Intravenous Stopped 9/24/19 1820)       Briefly, this is a 57year old male that  presents the emergency department requesting admission for alcohol detox. States that he is an everyday drinker of 8-12 beers per day. Reports that he was hospitalized recently but did begin drinking again because he was not accepted to inpatient rehab after hospitalization and detox. The patient is homeless and has history of schizophrenia and bipolar, he is not currently taking his medications. He is accompanied today by pastoral care from the community, states that he was dismissed from living in transitional care which is mental health living situation. He was asked to leave due to the alcohol intake.     Patient reports history of feeling tremulous, states that he has had

## 2021-06-28 ENCOUNTER — HOSPITAL ENCOUNTER (OUTPATIENT)
Age: 51
Setting detail: OBSERVATION
Discharge: NURSING, REHAB OR LONG-TERM CARE | End: 2021-07-01
Attending: EMERGENCY MEDICINE | Admitting: INTERNAL MEDICINE
Payer: COMMERCIAL

## 2021-06-28 ENCOUNTER — APPOINTMENT (OUTPATIENT)
Dept: GENERAL RADIOLOGY | Age: 51
End: 2021-06-28
Payer: COMMERCIAL

## 2021-06-28 DIAGNOSIS — F31.9 BIPOLAR 1 DISORDER (HCC): ICD-10-CM

## 2021-06-28 DIAGNOSIS — J18.9 PNEUMONIA OF BOTH LUNGS DUE TO INFECTIOUS ORGANISM, UNSPECIFIED PART OF LUNG: ICD-10-CM

## 2021-06-28 DIAGNOSIS — J96.01 ACUTE RESPIRATORY FAILURE WITH HYPOXIA AND HYPERCAPNIA (HCC): Primary | ICD-10-CM

## 2021-06-28 DIAGNOSIS — F10.930 ALCOHOL WITHDRAWAL, UNCOMPLICATED (HCC): ICD-10-CM

## 2021-06-28 DIAGNOSIS — J96.02 ACUTE RESPIRATORY FAILURE WITH HYPOXIA AND HYPERCAPNIA (HCC): Primary | ICD-10-CM

## 2021-06-28 PROBLEM — R09.02 HYPOXIA: Status: ACTIVE | Noted: 2021-06-28

## 2021-06-28 LAB
AMPHETAMINE SCREEN, URINE: NORMAL
ANION GAP SERPL CALCULATED.3IONS-SCNC: 8 MMOL/L (ref 3–16)
BARBITURATE SCREEN URINE: NORMAL
BASE EXCESS ARTERIAL: 5.9 MMOL/L (ref -3–3)
BASE EXCESS VENOUS: 5.7 MMOL/L (ref -2–3)
BASE EXCESS VENOUS: 5.8 MMOL/L (ref -2–3)
BASOPHILS ABSOLUTE: 0.1 K/UL (ref 0–0.2)
BASOPHILS RELATIVE PERCENT: 0.8 %
BENZODIAZEPINE SCREEN, URINE: NORMAL
BUN BLDV-MCNC: 8 MG/DL (ref 7–20)
CALCIUM SERPL-MCNC: 8.7 MG/DL (ref 8.3–10.6)
CANNABINOID SCREEN URINE: NORMAL
CARBOXYHEMOGLOBIN ARTERIAL: 1.2 % (ref 0–1.5)
CARBOXYHEMOGLOBIN: 1.2 % (ref 0–1.5)
CARBOXYHEMOGLOBIN: 1.2 % (ref 0–1.5)
CHLORIDE BLD-SCNC: 101 MMOL/L (ref 99–110)
CO2: 31 MMOL/L (ref 21–32)
COCAINE METABOLITE SCREEN URINE: NORMAL
CREAT SERPL-MCNC: 0.8 MG/DL (ref 0.9–1.3)
EKG ATRIAL RATE: 107 BPM
EKG DIAGNOSIS: NORMAL
EKG P AXIS: 60 DEGREES
EKG P-R INTERVAL: 182 MS
EKG Q-T INTERVAL: 344 MS
EKG QRS DURATION: 92 MS
EKG QTC CALCULATION (BAZETT): 459 MS
EKG R AXIS: 50 DEGREES
EKG T AXIS: 58 DEGREES
EKG VENTRICULAR RATE: 107 BPM
EOSINOPHILS ABSOLUTE: 0.4 K/UL (ref 0–0.6)
EOSINOPHILS RELATIVE PERCENT: 4.8 %
GFR AFRICAN AMERICAN: >60
GFR NON-AFRICAN AMERICAN: >60
GLUCOSE BLD-MCNC: 173 MG/DL (ref 70–99)
HCO3 ARTERIAL: 34 MMOL/L (ref 21–29)
HCO3 VENOUS: 35.6 MMOL/L (ref 24–28)
HCO3 VENOUS: 35.7 MMOL/L (ref 24–28)
HCT VFR BLD CALC: 37.8 % (ref 40.5–52.5)
HEMOGLOBIN, ART, EXTENDED: 13 G/DL
HEMOGLOBIN, VEN, REDUCED: 21.5 %
HEMOGLOBIN, VEN, REDUCED: 29.3 %
HEMOGLOBIN: 12.3 G/DL (ref 13.5–17.5)
LACTIC ACID, SEPSIS: 1.7 MMOL/L (ref 0.4–1.9)
LYMPHOCYTES ABSOLUTE: 1.7 K/UL (ref 1–5.1)
LYMPHOCYTES RELATIVE PERCENT: 19.8 %
Lab: NORMAL
MCH RBC QN AUTO: 28 PG (ref 26–34)
MCHC RBC AUTO-ENTMCNC: 32.5 G/DL (ref 31–36)
MCV RBC AUTO: 86.1 FL (ref 80–100)
METHADONE SCREEN, URINE: NORMAL
METHEMOGLOBIN ARTERIAL: 0.4 % (ref 0–1.4)
METHEMOGLOBIN VENOUS: 0.4 % (ref 0–1.5)
METHEMOGLOBIN VENOUS: 0.5 % (ref 0–1.5)
MONOCYTES ABSOLUTE: 0.8 K/UL (ref 0–1.3)
MONOCYTES RELATIVE PERCENT: 10 %
NEUTROPHILS ABSOLUTE: 5.5 K/UL (ref 1.7–7.7)
NEUTROPHILS RELATIVE PERCENT: 64.6 %
O2 SAT, ARTERIAL: 98 % (ref 93–100)
O2 SAT, VEN: 70 %
O2 SAT, VEN: 78 %
OPIATE SCREEN URINE: NORMAL
OXYCODONE URINE: NORMAL
PCO2 ARTERIAL: 63.9 MMHG (ref 35–45)
PCO2, VEN: 78 MMHG (ref 41–51)
PCO2, VEN: 81.3 MMHG (ref 41–51)
PDW BLD-RTO: 15.6 % (ref 12.4–15.4)
PH ARTERIAL: 7.33 (ref 7.35–7.45)
PH UA: 7
PH VENOUS: 7.25 (ref 7.35–7.45)
PH VENOUS: 7.27 (ref 7.35–7.45)
PHENCYCLIDINE SCREEN URINE: NORMAL
PLATELET # BLD: 275 K/UL (ref 135–450)
PMV BLD AUTO: 7.5 FL (ref 5–10.5)
PO2 ARTERIAL: 94.4 MMHG (ref 75–108)
PO2, VEN: 42.6 MMHG (ref 25–40)
PO2, VEN: 49.4 MMHG (ref 25–40)
POTASSIUM REFLEX MAGNESIUM: 4.2 MMOL/L (ref 3.5–5.1)
PRO-BNP: 36 PG/ML (ref 0–124)
PROCALCITONIN: 0.04 NG/ML (ref 0–0.15)
PROPOXYPHENE SCREEN: NORMAL
RAPID INFLUENZA  B AGN: NEGATIVE
RAPID INFLUENZA A AGN: NEGATIVE
RBC # BLD: 4.39 M/UL (ref 4.2–5.9)
SARS-COV-2: NOT DETECTED
SODIUM BLD-SCNC: 140 MMOL/L (ref 136–145)
TCO2 ARTERIAL: 36 MMOL/L
TCO2 CALC VENOUS: 38 MMOL/L
TCO2 CALC VENOUS: 38 MMOL/L
TROPONIN: <0.01 NG/ML
WBC # BLD: 8.4 K/UL (ref 4–11)

## 2021-06-28 PROCEDURE — 93005 ELECTROCARDIOGRAM TRACING: CPT | Performed by: STUDENT IN AN ORGANIZED HEALTH CARE EDUCATION/TRAINING PROGRAM

## 2021-06-28 PROCEDURE — 96365 THER/PROPH/DIAG IV INF INIT: CPT

## 2021-06-28 PROCEDURE — 2580000003 HC RX 258: Performed by: EMERGENCY MEDICINE

## 2021-06-28 PROCEDURE — 6360000002 HC RX W HCPCS: Performed by: EMERGENCY MEDICINE

## 2021-06-28 PROCEDURE — 6370000000 HC RX 637 (ALT 250 FOR IP): Performed by: INTERNAL MEDICINE

## 2021-06-28 PROCEDURE — G0378 HOSPITAL OBSERVATION PER HR: HCPCS

## 2021-06-28 PROCEDURE — 36415 COLL VENOUS BLD VENIPUNCTURE: CPT

## 2021-06-28 PROCEDURE — 99284 EMERGENCY DEPT VISIT MOD MDM: CPT

## 2021-06-28 PROCEDURE — 36600 WITHDRAWAL OF ARTERIAL BLOOD: CPT

## 2021-06-28 PROCEDURE — 94640 AIRWAY INHALATION TREATMENT: CPT

## 2021-06-28 PROCEDURE — 80307 DRUG TEST PRSMV CHEM ANLYZR: CPT

## 2021-06-28 PROCEDURE — 83605 ASSAY OF LACTIC ACID: CPT

## 2021-06-28 PROCEDURE — 96372 THER/PROPH/DIAG INJ SC/IM: CPT

## 2021-06-28 PROCEDURE — 83880 ASSAY OF NATRIURETIC PEPTIDE: CPT

## 2021-06-28 PROCEDURE — 94660 CPAP INITIATION&MGMT: CPT

## 2021-06-28 PROCEDURE — 6370000000 HC RX 637 (ALT 250 FOR IP): Performed by: EMERGENCY MEDICINE

## 2021-06-28 PROCEDURE — 2700000000 HC OXYGEN THERAPY PER DAY

## 2021-06-28 PROCEDURE — 6360000002 HC RX W HCPCS: Performed by: INTERNAL MEDICINE

## 2021-06-28 PROCEDURE — 87804 INFLUENZA ASSAY W/OPTIC: CPT

## 2021-06-28 PROCEDURE — 96375 TX/PRO/DX INJ NEW DRUG ADDON: CPT

## 2021-06-28 PROCEDURE — 84484 ASSAY OF TROPONIN QUANT: CPT

## 2021-06-28 PROCEDURE — 82803 BLOOD GASES ANY COMBINATION: CPT

## 2021-06-28 PROCEDURE — U0005 INFEC AGEN DETEC AMPLI PROBE: HCPCS

## 2021-06-28 PROCEDURE — 84145 PROCALCITONIN (PCT): CPT

## 2021-06-28 PROCEDURE — 94761 N-INVAS EAR/PLS OXIMETRY MLT: CPT

## 2021-06-28 PROCEDURE — U0003 INFECTIOUS AGENT DETECTION BY NUCLEIC ACID (DNA OR RNA); SEVERE ACUTE RESPIRATORY SYNDROME CORONAVIRUS 2 (SARS-COV-2) (CORONAVIRUS DISEASE [COVID-19]), AMPLIFIED PROBE TECHNIQUE, MAKING USE OF HIGH THROUGHPUT TECHNOLOGIES AS DESCRIBED BY CMS-2020-01-R: HCPCS

## 2021-06-28 PROCEDURE — 85025 COMPLETE CBC W/AUTO DIFF WBC: CPT

## 2021-06-28 PROCEDURE — 71045 X-RAY EXAM CHEST 1 VIEW: CPT

## 2021-06-28 PROCEDURE — 96366 THER/PROPH/DIAG IV INF ADDON: CPT

## 2021-06-28 PROCEDURE — 99223 1ST HOSP IP/OBS HIGH 75: CPT | Performed by: INTERNAL MEDICINE

## 2021-06-28 PROCEDURE — 2580000003 HC RX 258: Performed by: INTERNAL MEDICINE

## 2021-06-28 PROCEDURE — 80048 BASIC METABOLIC PNL TOTAL CA: CPT

## 2021-06-28 RX ORDER — FENOFIBRATE 48 MG/1
48 TABLET, COATED ORAL DAILY
COMMUNITY

## 2021-06-28 RX ORDER — POLYETHYLENE GLYCOL 3350 17 G/17G
17 POWDER, FOR SOLUTION ORAL DAILY PRN
Status: DISCONTINUED | OUTPATIENT
Start: 2021-06-28 | End: 2021-07-01 | Stop reason: HOSPADM

## 2021-06-28 RX ORDER — IPRATROPIUM BROMIDE AND ALBUTEROL SULFATE 2.5; .5 MG/3ML; MG/3ML
1 SOLUTION RESPIRATORY (INHALATION) ONCE
Status: COMPLETED | OUTPATIENT
Start: 2021-06-28 | End: 2021-06-28

## 2021-06-28 RX ORDER — FUROSEMIDE 40 MG/1
40 TABLET ORAL 2 TIMES DAILY
COMMUNITY

## 2021-06-28 RX ORDER — ONDANSETRON 2 MG/ML
4 INJECTION INTRAMUSCULAR; INTRAVENOUS EVERY 6 HOURS PRN
Status: DISCONTINUED | OUTPATIENT
Start: 2021-06-28 | End: 2021-07-01 | Stop reason: HOSPADM

## 2021-06-28 RX ORDER — PREDNISONE 20 MG/1
40 TABLET ORAL DAILY
Qty: 8 TABLET | Refills: 0 | Status: SHIPPED | OUTPATIENT
Start: 2021-06-29 | End: 2021-07-03

## 2021-06-28 RX ORDER — MAGNESIUM HYDROXIDE/ALUMINUM HYDROXICE/SIMETHICONE 120; 1200; 1200 MG/30ML; MG/30ML; MG/30ML
30 SUSPENSION ORAL EVERY 4 HOURS PRN
Status: DISCONTINUED | OUTPATIENT
Start: 2021-06-28 | End: 2021-07-01 | Stop reason: HOSPADM

## 2021-06-28 RX ORDER — FUROSEMIDE 40 MG/1
40 TABLET ORAL 2 TIMES DAILY
Status: DISCONTINUED | OUTPATIENT
Start: 2021-06-28 | End: 2021-07-01 | Stop reason: HOSPADM

## 2021-06-28 RX ORDER — NICOTINE 21 MG/24HR
1 PATCH, TRANSDERMAL 24 HOURS TRANSDERMAL EVERY 24 HOURS
Status: DISCONTINUED | OUTPATIENT
Start: 2021-06-28 | End: 2021-07-01 | Stop reason: HOSPADM

## 2021-06-28 RX ORDER — PROPRANOLOL HYDROCHLORIDE 20 MG/1
10 TABLET ORAL 3 TIMES DAILY
Status: DISCONTINUED | OUTPATIENT
Start: 2021-06-28 | End: 2021-07-01 | Stop reason: HOSPADM

## 2021-06-28 RX ORDER — LIDOCAINE 50 MG/G
1 PATCH TOPICAL DAILY
COMMUNITY

## 2021-06-28 RX ORDER — ACETAMINOPHEN 650 MG/1
650 SUPPOSITORY RECTAL EVERY 6 HOURS PRN
Status: DISCONTINUED | OUTPATIENT
Start: 2021-06-28 | End: 2021-07-01 | Stop reason: HOSPADM

## 2021-06-28 RX ORDER — FAMOTIDINE 20 MG/1
20 TABLET, FILM COATED ORAL DAILY
COMMUNITY

## 2021-06-28 RX ORDER — ALBUTEROL SULFATE 2.5 MG/3ML
2.5 SOLUTION RESPIRATORY (INHALATION)
Status: DISCONTINUED | OUTPATIENT
Start: 2021-06-28 | End: 2021-06-30

## 2021-06-28 RX ORDER — POTASSIUM CHLORIDE 750 MG/1
20 CAPSULE, EXTENDED RELEASE ORAL DAILY
COMMUNITY

## 2021-06-28 RX ORDER — SPIRONOLACTONE 25 MG/1
50 TABLET ORAL 2 TIMES DAILY
COMMUNITY

## 2021-06-28 RX ORDER — ONDANSETRON 4 MG/1
4 TABLET, ORALLY DISINTEGRATING ORAL EVERY 8 HOURS PRN
Status: DISCONTINUED | OUTPATIENT
Start: 2021-06-28 | End: 2021-07-01 | Stop reason: HOSPADM

## 2021-06-28 RX ORDER — SPIRONOLACTONE 50 MG/1
50 TABLET, FILM COATED ORAL 2 TIMES DAILY
Status: DISCONTINUED | OUTPATIENT
Start: 2021-06-28 | End: 2021-07-01 | Stop reason: HOSPADM

## 2021-06-28 RX ORDER — SODIUM CHLORIDE 9 MG/ML
25 INJECTION, SOLUTION INTRAVENOUS PRN
Status: DISCONTINUED | OUTPATIENT
Start: 2021-06-28 | End: 2021-07-01 | Stop reason: HOSPADM

## 2021-06-28 RX ORDER — CLONAZEPAM 1 MG/1
1 TABLET ORAL 3 TIMES DAILY
Status: ON HOLD | COMMUNITY
End: 2021-06-29 | Stop reason: SDUPTHER

## 2021-06-28 RX ORDER — TRAZODONE HYDROCHLORIDE 50 MG/1
50 TABLET ORAL NIGHTLY PRN
Status: DISCONTINUED | OUTPATIENT
Start: 2021-06-28 | End: 2021-07-01 | Stop reason: HOSPADM

## 2021-06-28 RX ORDER — PREDNISONE 20 MG/1
40 TABLET ORAL DAILY
Status: DISCONTINUED | OUTPATIENT
Start: 2021-06-28 | End: 2021-07-01 | Stop reason: HOSPADM

## 2021-06-28 RX ORDER — LACTULOSE 10 G/15ML
20 SOLUTION ORAL 2 TIMES DAILY
Status: DISCONTINUED | OUTPATIENT
Start: 2021-06-28 | End: 2021-07-01 | Stop reason: HOSPADM

## 2021-06-28 RX ORDER — ACETAMINOPHEN 325 MG/1
650 TABLET ORAL EVERY 6 HOURS PRN
COMMUNITY

## 2021-06-28 RX ORDER — ACETAMINOPHEN 325 MG/1
650 TABLET ORAL EVERY 6 HOURS PRN
Status: DISCONTINUED | OUTPATIENT
Start: 2021-06-28 | End: 2021-07-01 | Stop reason: HOSPADM

## 2021-06-28 RX ORDER — ALBUTEROL SULFATE 90 UG/1
2 AEROSOL, METERED RESPIRATORY (INHALATION) EVERY 6 HOURS PRN
COMMUNITY

## 2021-06-28 RX ORDER — GAUZE BANDAGE 2" X 2"
100 BANDAGE TOPICAL DAILY
Status: DISCONTINUED | OUTPATIENT
Start: 2021-06-28 | End: 2021-07-01 | Stop reason: HOSPADM

## 2021-06-28 RX ORDER — ALUMINA, MAGNESIA, AND SIMETHICONE 2400; 2400; 240 MG/30ML; MG/30ML; MG/30ML
30 SUSPENSION ORAL EVERY 4 HOURS PRN
COMMUNITY

## 2021-06-28 RX ORDER — NITROGLYCERIN 0.4 MG/1
0.4 TABLET SUBLINGUAL EVERY 5 MIN PRN
COMMUNITY

## 2021-06-28 RX ORDER — CLONAZEPAM 1 MG/1
1 TABLET ORAL 3 TIMES DAILY
Status: DISCONTINUED | OUTPATIENT
Start: 2021-06-28 | End: 2021-07-01 | Stop reason: HOSPADM

## 2021-06-28 RX ORDER — DEXAMETHASONE SODIUM PHOSPHATE 4 MG/ML
8 INJECTION, SOLUTION INTRA-ARTICULAR; INTRALESIONAL; INTRAMUSCULAR; INTRAVENOUS; SOFT TISSUE ONCE
Status: COMPLETED | OUTPATIENT
Start: 2021-06-28 | End: 2021-06-28

## 2021-06-28 RX ORDER — CARBAMAZEPINE 200 MG/1
200 TABLET ORAL 3 TIMES DAILY
Status: DISCONTINUED | OUTPATIENT
Start: 2021-06-28 | End: 2021-07-01 | Stop reason: HOSPADM

## 2021-06-28 RX ORDER — FENOFIBRATE 54 MG/1
54 TABLET ORAL DAILY
Status: DISCONTINUED | OUTPATIENT
Start: 2021-06-28 | End: 2021-07-01 | Stop reason: HOSPADM

## 2021-06-28 RX ORDER — POTASSIUM CHLORIDE 750 MG/1
20 TABLET, EXTENDED RELEASE ORAL DAILY
Status: DISCONTINUED | OUTPATIENT
Start: 2021-06-28 | End: 2021-07-01 | Stop reason: HOSPADM

## 2021-06-28 RX ORDER — THIAMINE MONONITRATE (VIT B1) 100 MG
100 TABLET ORAL DAILY
COMMUNITY

## 2021-06-28 RX ORDER — IPRATROPIUM BROMIDE AND ALBUTEROL SULFATE 2.5; .5 MG/3ML; MG/3ML
1 SOLUTION RESPIRATORY (INHALATION)
Status: DISCONTINUED | OUTPATIENT
Start: 2021-06-28 | End: 2021-06-28 | Stop reason: CLARIF

## 2021-06-28 RX ORDER — CARBAMAZEPINE 200 MG/1
200 TABLET ORAL 3 TIMES DAILY
COMMUNITY

## 2021-06-28 RX ORDER — LACTULOSE 10 G/15ML
20 SOLUTION ORAL 2 TIMES DAILY
COMMUNITY

## 2021-06-28 RX ORDER — SODIUM CHLORIDE 0.9 % (FLUSH) 0.9 %
5-40 SYRINGE (ML) INJECTION EVERY 12 HOURS SCHEDULED
Status: DISCONTINUED | OUTPATIENT
Start: 2021-06-28 | End: 2021-07-01 | Stop reason: HOSPADM

## 2021-06-28 RX ORDER — ZIPRASIDONE MESYLATE 20 MG/ML
20 INJECTION, POWDER, LYOPHILIZED, FOR SOLUTION INTRAMUSCULAR 2 TIMES DAILY PRN
Status: DISCONTINUED | OUTPATIENT
Start: 2021-06-28 | End: 2021-07-01 | Stop reason: HOSPADM

## 2021-06-28 RX ORDER — SODIUM CHLORIDE 0.9 % (FLUSH) 0.9 %
5-40 SYRINGE (ML) INJECTION PRN
Status: DISCONTINUED | OUTPATIENT
Start: 2021-06-28 | End: 2021-07-01 | Stop reason: HOSPADM

## 2021-06-28 RX ORDER — ZIPRASIDONE MESYLATE 20 MG/ML
20 INJECTION, POWDER, LYOPHILIZED, FOR SOLUTION INTRAMUSCULAR 2 TIMES DAILY PRN
COMMUNITY

## 2021-06-28 RX ORDER — FAMOTIDINE 20 MG/1
20 TABLET, FILM COATED ORAL DAILY
Status: DISCONTINUED | OUTPATIENT
Start: 2021-06-28 | End: 2021-07-01 | Stop reason: HOSPADM

## 2021-06-28 RX ORDER — PROPRANOLOL HYDROCHLORIDE 20 MG/1
10 TABLET ORAL 3 TIMES DAILY
COMMUNITY

## 2021-06-28 RX ORDER — TRAZODONE HYDROCHLORIDE 50 MG/1
50 TABLET ORAL NIGHTLY PRN
Status: ON HOLD | COMMUNITY
End: 2021-06-29 | Stop reason: SDUPTHER

## 2021-06-28 RX ORDER — GABAPENTIN 300 MG/1
300 CAPSULE ORAL 4 TIMES DAILY
Status: ON HOLD | COMMUNITY
End: 2021-06-29 | Stop reason: SDUPTHER

## 2021-06-28 RX ORDER — NICOTINE 21 MG/24HR
1 PATCH, TRANSDERMAL 24 HOURS TRANSDERMAL EVERY 24 HOURS
COMMUNITY

## 2021-06-28 RX ADMIN — AZITHROMYCIN MONOHYDRATE 500 MG: 500 INJECTION, POWDER, LYOPHILIZED, FOR SOLUTION INTRAVENOUS at 06:36

## 2021-06-28 RX ADMIN — Medication 10 ML: at 09:42

## 2021-06-28 RX ADMIN — PROPRANOLOL HYDROCHLORIDE 10 MG: 20 TABLET ORAL at 18:05

## 2021-06-28 RX ADMIN — CLONAZEPAM 1 MG: 1 TABLET ORAL at 21:41

## 2021-06-28 RX ADMIN — IPRATROPIUM BROMIDE AND ALBUTEROL SULFATE 1 AMPULE: .5; 2.5 SOLUTION RESPIRATORY (INHALATION) at 03:32

## 2021-06-28 RX ADMIN — FAMOTIDINE 20 MG: 20 TABLET ORAL at 09:40

## 2021-06-28 RX ADMIN — PREDNISONE 40 MG: 20 TABLET ORAL at 09:41

## 2021-06-28 RX ADMIN — ENOXAPARIN SODIUM 40 MG: 40 INJECTION SUBCUTANEOUS at 09:42

## 2021-06-28 RX ADMIN — Medication 10 ML: at 21:48

## 2021-06-28 RX ADMIN — FUROSEMIDE 40 MG: 40 TABLET ORAL at 18:05

## 2021-06-28 RX ADMIN — CLONAZEPAM 1 MG: 1 TABLET ORAL at 14:44

## 2021-06-28 RX ADMIN — Medication 100 MG: at 09:40

## 2021-06-28 RX ADMIN — CARBAMAZEPINE 200 MG: 200 TABLET ORAL at 14:44

## 2021-06-28 RX ADMIN — CEFTRIAXONE 1000 MG: 1 INJECTION, POWDER, FOR SOLUTION INTRAMUSCULAR; INTRAVENOUS at 05:16

## 2021-06-28 RX ADMIN — LACTULOSE 20 G: 20 SOLUTION ORAL at 09:42

## 2021-06-28 RX ADMIN — RIFAXIMIN 550 MG: 550 TABLET ORAL at 21:44

## 2021-06-28 RX ADMIN — CARBAMAZEPINE 200 MG: 200 TABLET ORAL at 09:47

## 2021-06-28 RX ADMIN — CLONAZEPAM 1 MG: 1 TABLET ORAL at 09:41

## 2021-06-28 RX ADMIN — DEXAMETHASONE SODIUM PHOSPHATE 8 MG: 4 INJECTION, SOLUTION INTRAMUSCULAR; INTRAVENOUS at 05:46

## 2021-06-28 RX ADMIN — FENOFIBRATE 54 MG: 54 TABLET ORAL at 09:41

## 2021-06-28 RX ADMIN — SPIRONOLACTONE 50 MG: 50 TABLET ORAL at 21:43

## 2021-06-28 RX ADMIN — IPRATROPIUM BROMIDE AND ALBUTEROL 1 PUFF: 20; 100 SPRAY, METERED RESPIRATORY (INHALATION) at 16:30

## 2021-06-28 RX ADMIN — POTASSIUM CHLORIDE 20 MEQ: 750 TABLET, EXTENDED RELEASE ORAL at 09:40

## 2021-06-28 RX ADMIN — IPRATROPIUM BROMIDE AND ALBUTEROL 1 PUFF: 20; 100 SPRAY, METERED RESPIRATORY (INHALATION) at 22:34

## 2021-06-28 RX ADMIN — CARBAMAZEPINE 200 MG: 200 TABLET ORAL at 21:46

## 2021-06-28 ASSESSMENT — PAIN SCALES - GENERAL
PAINLEVEL_OUTOF10: 0

## 2021-06-28 ASSESSMENT — ENCOUNTER SYMPTOMS
SHORTNESS OF BREATH: 1
VOMITING: 0
NAUSEA: 0

## 2021-06-28 NOTE — PROGRESS NOTES
4 Eyes Admission Assessment     I agree as the admission nurse that 2 RN's have performed a thorough Head to Toe Skin Assessment on the patient. ALL assessment sites listed below have been assessed on admission. Areas assessed by both nurses:   [x]   Head, Face, and Ears   [x]   Shoulders, Back, and Chest  [x]   Arms, Elbows, and Hands   [x]   Coccyx, Sacrum, and Ischium  [x]   Legs, Feet, and Heels        Does the Patient have Skin Breakdown?  Scattered bruising         Héctor Prevention initiated:  YES  Wound Care Orders initiated:  NO      [facility] nurse consulted for Pressure Injury (Stage 3,4, Unstageable, DTI, NWPT, and Complex wounds) or Héctor score 18 or lower: NO      Nurse 1 eSignature: Electronically signed by Reza Farrell RN on 6/28/21 at 10:41 PM EDT    **SHARE this note so that the co-signing nurse is able to place an eSignature**    Nurse 2 eSignature: Electronically signed by Dixon Orozco RN on 6/28/21 at 7:20 AM EDT

## 2021-06-28 NOTE — FLOWSHEET NOTE
06/28/21 1535   Encounter Summary   Services provided to: Patient not available   Continue Visiting   (es 6/28 attempted acp)   Complexity of Encounter High   Length of Encounter 15 minutes   Patient's decision making capacity is in question. I left a voice mail for patient's , Gial, because she is the only contact. See acp note.

## 2021-06-28 NOTE — PROGRESS NOTES
Clinical Pharmacy Progress Note  Medication History     Admit Date: 6/28/2021    List of of current medications patient is taking is complete. Home Medication list in EPIC updated to reflect changes noted below. Source of information: med list from [facility]; list dated 6/22/21 with edits from 6/25/21     Changes made to medication list:   Medications removed: (include reason, ex: therapy completed, inactive medication)   Sucralfate - not active med per [facility] from [facility] [address] Buspirone - not active med per [facility] from [facility]  Medications added:    Rifaximin    Lidocaine patch   Potassium    Fenofibrate   Famotidine   Thiamine   Nicotine patch   Spironolactone    Furosemide    Propranolol    Gabapentin    Clonazepam    Carbamazepine - dose increased to 300 mg TID (from BID) on 6/25/21   Albuterol PRN   Nitroglycerin SL PRN   Ziprasidone IM PRN   Trazodone PRN   Mylanta PRN   Milk of magnesium PRN   Acetaminophen PRN  Other notes:   Debi Mercedes (Paliperidone) 325 mg IM x1 given on Fri 6/25/21      Please call with any questions.   Paul Cartwright, PharmD, BCPS  Wireless: [id number]   6/28/2021 8:11 AM

## 2021-06-28 NOTE — ED PROVIDER NOTES
ED Attending Attestation Note     Date of evaluation: 6/28/2021    This patient was seen by the resident. I have seen and examined the patient, agree with the workup, evaluation, management and diagnosis. The care plan has been discussed. I have reviewed the ECG and concur with the resident's interpretation. My assessment reveals 82-year-old male presenting to the emergency department with difficulty breathing. The patient presents from [facility]. He reports that he has had his Covid vaccination but is unclear on timing. The patient was notably dyspneic with tachypnea and oxygen saturations in the upper 80s on room air. His laboratory investigations were notable for a acute hypercarbic respiratory failure with a PCO2 in the 70s. Lung exam was consistent with diffuse coarse wheezes his chest x-ray showed bilateral infiltrates which was read as concern for volume overload though the patient has a BNP which is not significantly elevated making this less likely. Was concern for the possibility of a pneumonia so the patient was given ceftriaxone azithromycin for coverage of a presumed community-acquired process. The patient was placed on noninvasive positive pressure ventilation given bronchodilator treatment   Following this intervention, the patient had some symptomatic improvement. His breathing was more nonlabored. Repeat venous gas showed essentially unchanged values but since the patient remains clinically improved at the bedside we will continue with noninvasive positive pressure ventilation patient will be admitted to PCU as he has a Covid rule out. He was additionally given Decadron. Due to the immediate potential for life-threatening deterioration due to acute hypoxic and hypercarbic respiratory failure, pneumonia, I spent 35 minutes providing direct bedside critical care.   This time is excluding time spent supervising residents and time spent performing separately billed procedures      Clinical impression -acute hypoxic and hypercarbic respiratory failure, pneumonia community-acquired versus viral    Disposition - admission     Rosa Campbell MD  06/28/21 [address] [facility] [place] [address], MD  06/28/21 8597

## 2021-06-28 NOTE — CARE COORDINATION
Case Management Assessment           Initial Evaluation                Date / Time of Evaluation: 6/28/2021 10:48 AM                 Assessment Completed by: Alaina Cruz RN    Patient Name: Adwoa Mcneil     YOB: 1970  Diagnosis: Acute respiratory failure with hypoxia and hypercapnia ([facility] [address] [J96.01, J96.02]     Date / Time: 6/28/2021  1:27 AM    Patient Admission Status: Inpatient    If patient is discharged prior to next notation, then this note serves as note for discharge by case management. Current PCP: No primary care provider on file. Clinic Patient: No    Chart Reviewed: Yes  Patient/ Family Interviewed: Yes    Initial assessment completed at bedside with: patient    Hospitalization in the last 30 days: No    Emergency Contacts:  Extended Emergency Contact Information  Primary Emergency Contact: ambreen shoemaker  Home Phone: [phone]  Relation: Other    Advance Directives:   Code Status: Full [address]: No      Financial  Payor: Sony Jenkins / Plan: Gretel Hwang / Product Type: *No Product type* /     Pre-cert required for SNF: Yes    Pharmacy    [address], [facility] [phone] - F [phone] [address] [facility] [address]  Phone: [phone] Fax: [phone]      Potential assistance Purchasing Medications: Potential Assistance Purchasing Medications: No  Does Patient want to participate in local refill/ meds to beds program?: Not Assessed    Meds To Beds General Rules:  1. Can ONLY be done Monday- Friday between 8:30am-5pm  2. Prescription(s) must be in pharmacy by 3pm to be filled same day  3. Copy of patient's insurance/ prescription drug card and patient face sheet must be sent along with the prescription(s)  4. Cost of Rx cannot be added to hospital bill. If financial assistance is needed, please contact unit  or ;   or  CANNOT provide pharmacy voucher for patients co-pays  5. Patients can then  the prescription on their way out of the hospital at discharge, or pharmacy can deliver to the bedside if staff is available. (payment due at time of pick-up or delivery - cash, check, or card accepted)     Able to afford home medications/ co-pay costs: Yes    ADLS  Support Systems: None    PT AM-PAC:   /24  OT AM-PAC:   /24    [place]: homeless  Steps:     Plans to RETURN to current housing: No  Barriers to RETURNING to current housing: may need psych placement    [address]  Currently ACTIVE with [address]: No  Home Care Agency: Not Applicable    Currently ACTIVE with [place] on Aging: No  Passport/ Waiver: No  Passport/ Waiver Services: Not Applicable          Durable Medical Equipment  DME Provider: n/a  Equipment: n/a        Dialysis  Active with HD/PD prior to admission: No  Nephrologist:     HD Center:  Not Applicable    DISCHARGE PLAN:  Disposition: Inpatient Pscyh: TBD Phone: ? Fax: ?    Transportation PLAN for discharge: EMS transportation     Factors facilitating achievement of predicted outcomes: Cooperative    Barriers to discharge: No family support and Impulsivity    Additional Case Management Notes:  Patient is from [facility], was recently at [facility] for skilled stay. CM spoke with pt who states he was homeless prior to staying at the nursing home. Patient was sent to [facility] for aggressive behavior. CM made a call to his , awaiting call back. Will need psych recommendations, as patient has a psych history with multiple hospitalizations.        The Plan for Transition of Care is related to the following treatment goals of Acute respiratory failure with hypoxia and hypercapnia ([facility] [address] [J96.01, J96.02]    The Patient and/or patient representative Liban Oliver and his family were provided with a choice of provider and agrees with the discharge plan Yes    Fre[employer] of

## 2021-06-28 NOTE — PROGRESS NOTES
Call placed to [facility] per patient's request for patient's belongings. Pt states there are a particular pair of shoes he would like to be brought to him as soon as possible. This nurse spoke with [employer] from [facility] callback number left with him. Pt made aware of call placed and that this nurse is awaiting callback.

## 2021-06-28 NOTE — H&P
Hospital Medicine History & Physical      PCP: No primary care provider on file. Date of Admission: 6/28/2021    Date of Service: Pt seen/examined on 06/28/21 and Admitted to Inpatient with expected LOS greater than two midnights due to medical therapy. Chief Complaint: Shortness of breath    History Of Present Illness:     52 y.o. male denies medical history of bipolar disorder, cirrhosis, hypertension, heart murmur, seizure disorder/traumatic brain injury presented with shortness of breath. Patient was admitted to [facility] for his psychiatric illness. Was found to be hypoxic and transferred to the ED. in the ED, patient was having difficulty breathing and was found to be hypoxic and upper 80s on room air. Was tachycardic and blood pressure was on the lower side. VBG was 7.25/81 and 50. Patient was placed on BiPAP. Patient was initially on BiPAP and was trying to speak through it. Dr. Johanna Keenan was at bedside and after patient insistence removed BiPAP. Patient states that he will had cough and asked for his rescue inhaler. His shortness of breath improved but then he was placed on the side and sent here. He did not ask for this transfer and states that he is feeling fine and he wants to leave. Patient keeps on repeating that he is okay and he would like to leave today. Explained to patient that we would like to ensure that he is safe before we can make the decision of discharging him. Patient still remained adamant that he would like to leave today. He stated that he has been honest if he is not being discharged today he will be pretty upset about it. He states that he is not trying to threaten but questions the fact that because he is going to be very upset. Difficult to gather history from patient as he keeps on being focused on leaving and did not want to answer any other questions. But the limited history that was obtained patient did deny fever, chills or productive sputum. Denied any previous lung condition. Did endorse smoking 10 cigars a day. Stated he has not been evaluated in the past and sleep apnea. Went back to stating that he has an apartment in [address] and would like to leave today. Throughout the interview with the patient, patient signs remained above 90% on room air. Explained to patient that we will try to ensure that he has a facility and he is safe before he can be discharged. Past Medical History:          Diagnosis Date    Aggressive behavior     Alcoholism ([facility] [address]     Angina pectoris ([facility] [address]     Anxiety     Bipolar 1 disorder (HCC)     Bipolar disorder (HCC)     Chronic pain     Cirrhosis ([facility] [address]     Heart murmur     Hernia of abdominal wall     Hypertension     PTSD (post-traumatic stress disorder)     Schizophrenia (HCC)     Seizures ([facility])     Thiamine deficiency     Traumatic brain injury [facility])        Past Surgical History:      No past surgical history on file. Medications Prior to Admission:      Prior to Admission medications    Medication Sig Start Date End Date Taking? Authorizing Provider   rifaximin (XIFAXAN) 550 MG tablet Take 550 mg by mouth 2 times daily   Yes Historical Provider, MD   lidocaine (LIDODERM) 5 % Place 1 patch onto the skin daily 12 hours on, 12 hours off.    Yes Historical Provider, MD   potassium chloride (MICRO-K) 10 MEQ extended release capsule Take 20 mEq by mouth daily   Yes Historical Provider, MD   fenofibrate (TRICOR) 48 MG tablet Take 48 mg by mouth daily   Yes Historical Provider, MD   famotidine (PEPCID) 20 MG tablet Take 20 mg by mouth daily   Yes Historical Provider, MD   vitamin B-1 (THIAMINE) 100 MG tablet Take 100 mg by mouth daily   Yes Historical Provider, MD   nicotine (NICODERM CQ) 21 MG/24HR Place 1 patch onto the skin every 24 hours   Yes Historical Provider, MD   spironolactone (ALDACTONE) 25 MG tablet Take 50 mg by mouth 2 times daily   Yes Historical Provider, MD   lactulose ([facility]) 10 to obtain accurately due to patient factors        Family history unknown: Yes       REVIEW OF SYSTEMS:   Pertinent positives as noted in the HPI. All other systems reviewed and negative. PHYSICAL EXAM PERFORMED:    BP (!) 154/101   Pulse 104   Temp 98.3 °F (36.8 °C) (Axillary)   Resp 30   Ht 6' 1\" (1.854 m)   Wt (!) 321 lb 14 oz (146 kg)   SpO2 98%   BMI 42.47 kg/m²     General appearance:  No apparent distress, appears stated age and cooperative. Morbidly obese  HEENT:  Normal cephalic, atraumatic without obvious deformity. Pupils equal, round, and reactive to light. Extra ocular muscles intact. Conjunctivae/corneas clear. Neck: Supple, with full range of motion. No jugular venous distention. Trachea midline. Respiratory: Decreased breath sounds bilaterally  Cardiovascular:  Regular rate and rhythm with normal S1/S2 without murmurs, rubs or gallops. Abdomen: Soft, non-tender, non-distended with normal bowel sounds. Musculoskeletal:  No clubbing, cyanosis or edema bilaterally. Full range of motion without deformity. Skin: Skin color, texture, turgor normal.  No rashes or lesions. Neurologic: Grossly non-focal.  Moving all extremities spontaneously  Psychiatric:  Alert and oriented. Anxious, agitated  Capillary Refill: Brisk,< 3 seconds   Peripheral Pulses: +2 palpable, equal bilaterally       Labs:     Recent Labs     06/28/21  0205   WBC 8.4   HGB 12.3*   HCT 37.8*        Recent Labs     06/28/21  0205      K 4.2      CO2 31   BUN 8   CREATININE 0.8*   CALCIUM 8.7     No results for input(s): AST, ALT, BILIDIR, BILITOT, ALKPHOS in the last 72 hours. No results for input(s): INR in the last 72 hours.   Recent Labs     06/28/21  0205   TROPONINI <0.01       Urinalysis:      Lab Results   Component Value Date    NITRU Negative 09/11/2019    WBCUA 0 09/11/2019    RBCUA 0 09/11/2019    BLOODU Negative 09/11/2019    SPECGRAV 1.010 09/11/2019    GLUCOSEU Negative 09/11/2019 discharge in 48 to 72 hours       Erika Pack MD

## 2021-06-28 NOTE — ACP (ADVANCE CARE PLANNING)
Advance Care Planning     Advance Care Planning Inpatient Note  Spiritual Care Department    Today's Date: 6/28/2021  Unit: [place] [address] PCU    Received request from IDT Member. Upon review of chart and communication with care team, Spiritual Care will defer advance care planning with patient at this time. left voice mail for patient's , Gila. Pt's decision making capacity in question. Goals of ACP Conversation:      Health Care Decision Makers:      Click here to complete [address] including selection of the Healthcare Decision Maker Relationship (ie \"Primary\"    Advance Care Planning Documents (Patient Wishes):      Assessment:  Patient has a history of alcohol abuse and paranoid schizophrenia. He has been homeless. His , Gila, is his only contact.       Interventions:    Outcomes/Plan:      Electronically signed by Deepak Reyes, [address] [employer] [employer] on 6/28/2021 at 3:38 PM

## 2021-06-28 NOTE — CONSULTS
Pulmonary Consult Note      Reason for Consult: hypercapnic respiratory failure   Requesting Physician: Kayli Malone    Subjective:     CHIEF COMPLAINT / HPI:                The patient is a 52 y.o. male with significant past medical history of bipolar disorder presented with complaints of shortness of breath. Patient lives at THE [facility] because of his psychiatric illnesses but may currently be homeless. He is a poor historian and states that he was having symptoms of cough at his nursing facility and requested his rescue inhaler and then showed up in the emergency room. Per the ED note patient presented with difficulty breathing and hypoxic to the upper 80s on room air and was found to be hypercapnic to the 70s on a VBG. He was placed on BiPAP with some improvement. For my initial evaluation patient was on BiPAP but was actively trying to remove it and talk through it. I was able to take him off the BiPAP and he maintained his oxygen saturations in the low to mid 90s for the duration of my time in the room. He reports that he never felt bad, and that he currently feels fine and is ready to go. He begged the hospitalist tonight to let him go today and that if we did not he was \"going to go F**bogdan off! \"  He clarified that this was not a threat, just what was going to happen because he will be upset. He denied any complaints. Past Medical History:      Diagnosis Date    Aggressive behavior     Alcoholism ([facility]     Angina pectoris ([facility] [facility]     Anxiety     Bipolar 1 disorder (HCC)     Bipolar disorder (HCC)     Chronic pain     Cirrhosis ([facility]     Heart murmur     Hernia of abdominal wall     Hypertension     PTSD (post-traumatic stress disorder)     Schizophrenia (HCC)     Seizures (HCC)     Thiamine deficiency     Traumatic [facility] injury [facility])       Past Surgical History:    No past surgical history on file.   Current Medications:     sodium chloride flush  5-40 mL Intravenous 2 times per day    enoxaparin  40 mg Subcutaneous Daily    predniSONE  40 mg Oral Daily    [START ON 6/29/2021] cefTRIAXone (ROCEPHIN) IV  1,000 mg Intravenous Q24H    [START ON 6/29/2021] azithromycin  500 mg Intravenous Q24H    albuterol-ipratropium  1 puff Inhalation Q4H While awake    carBAMazepine  200 mg Oral TID    clonazePAM  1 mg Oral TID    famotidine  20 mg Oral Daily    fenofibrate  54 mg Oral Daily    lactulose  20 g Oral BID    nicotine  1 patch Transdermal Q24H    vitamin B-1  100 mg Oral Daily    potassium chloride  20 mEq Oral Daily     Allergies: Allergies   Allergen Reactions    Haldol [Haloperidol]      ? RXN    Penicillins Other (See Comments)     Was 9 when had rxn.  Zyprexa [Olanzapine]      ? Rxn      Social History:    TOBACCO:   reports that he has been smoking cigarettes. He has been smoking about 0.50 packs per day. He has never used smokeless tobacco.  ETOH:   reports current alcohol use. Family History:       Family history unknown: Yes       REVIEW OF SYSTEMS:    CONSTITUTIONAL:  negative for fevers, chills, diaphoresis, activity change, appetite change, fatigue, night sweats and unexpected weight change.    EYES:  negative for blurred vision, eye discharge, visual disturbance and icterus  HEENT:  negative for hearing loss, tinnitus, ear drainage, sinus pressure, nasal congestion, epistaxis and snoring  RESPIRATORY:  See HPI  CARDIOVASCULAR:  negative for chest pain, palpitations, exertional chest pressure/discomfort, edema, syncope  GASTROINTESTINAL:  negative for nausea, vomiting, diarrhea, constipation, blood in stool and abdominal pain  GENITOURINARY:  negative for frequency, dysuria, urinary incontinence, decreased urine volume, and hematuria  HEMATOLOGIC/LYMPHATIC:  negative for easy bruising, bleeding and lymphadenopathy  ALLERGIC/IMMUNOLOGIC:  negative for recurrent infections, angioedema, anaphylaxis and drug reactions  ENDOCRINE:  negative for weight changes and diabetic symptoms including polyuria, polydipsia and polyphagia  MUSCULOSKELETAL:  negative for  pain, joint swelling, decreased range of motion and muscle weakness  NEUROLOGICAL:  negative for headaches, slurred speech, unilateral weakness  PSYCHIATRIC/BEHAVIORAL: negative for hallucinations, behavioral problems, confusion and agitation. Objective:   PHYSICAL EXAM:      VITALS:  BP (!) 154/101   Pulse 104   Temp 98.3 °F (36.8 °C) (Axillary)   Resp 30   Ht 6' 1\" (1.854 m)   Wt (!) 321 lb 14 oz (146 kg)   SpO2 98%   BMI 42.47 kg/m²   24HR INTAKE/OUTPUT:      Intake/Output Summary (Last 24 hours) at 2021 1036  Last data filed at 2021 0920  Gross per 24 hour   Intake 240 ml   Output 600 ml   Net -360 ml     CURRENT PULSE OXIMETRY:  SpO2: 98 %  24HR PULSE OXIMETRY RANGE:  SpO2  Av.9 %  Min: 96 %  Max: 99 % on BiPAP and then room air    CONSTITUTIONAL:  awake, alert, cooperative, no distress, and appears stated age  NECK:  Supple, symmetrical, trachea midline, no adenopathy, thyroid symmetric, not enlarged and no tenderness, skin normal  LUNGS: No increased work of breathing and clear to auscultation. No accessory muscle use  CARDIOVASCULAR:  normal S1 and S2, no edema and no JVD  ABDOMEN:  normal bowel sounds, non-distended and no masses palpated, and no tenderness to palpation. No hepatospleenomegaly  LYMPHADENOPATHY:  no axillary or supraclavicular adenopathy. No cervical adnenopathy  PSYCHIATRIC: Oriented to person, place and time. Anxious and a bit aggressive. Easily agitated  MUSCULOSKELETAL: No obvious misalignment or effusion of the joints. No clubbing, cyanosis of the digits. SKIN:  normal skin color, texture, turgor and no redness, warmth, or swelling.  No palpable nodules    DATA:    Old records have been reviewed  CBC with Differential:    Lab Results   Component Value Date    WBC 8.4 2021    RBC 4.39 2021    HGB 12.3 2021    HCT 37.8 2021     06/28/2021    MCV 86.1 06/28/2021    MCH 28.0 06/28/2021    MCHC 32.5 06/28/2021    RDW 15.6 06/28/2021    LYMPHOPCT 19.8 06/28/2021    MONOPCT 10.0 06/28/2021    BASOPCT 0.8 06/28/2021    MONOSABS 0.8 06/28/2021    LYMPHSABS 1.7 06/28/2021    EOSABS 0.4 06/28/2021    BASOSABS 0.1 06/28/2021     BMP:    Lab Results   Component Value Date     06/28/2021    K 4.2 06/28/2021     06/28/2021    CO2 31 06/28/2021    BUN 8 06/28/2021    CREATININE 0.8 06/28/2021    CALCIUM 8.7 06/28/2021    GFRAA >60 06/28/2021    LABGLOM >60 06/28/2021    GLUCOSE 173 06/28/2021     Hepatic Function Panel:    Lab Results   Component Value Date    ALKPHOS 120 09/24/2019    ALT 19 09/24/2019    AST 29 09/24/2019    PROT 8.8 09/24/2019    BILITOT 0.3 09/24/2019    BILIDIR <0.2 09/11/2019    IBILI see below 09/11/2019     ABG:    Lab Results   Component Value Date    GET2GQU 34 06/28/2021    BEART 5.9 06/28/2021    W2CTOSOI 98 06/28/2021    PHART 7.332 06/28/2021    QPR6SEQ 63.9 06/28/2021    PO2ART 94.4 06/28/2021    YEB3CAW 36 06/28/2021       Cultures:   Blood Culture:    Sputum Culture:        Radiology Review:  All pertinent images / reports were reviewed as a part of this visit. imaging reveals the following:  XR CHEST PORTABLE   Final Result     Diminished lung volumes with subsegmental perihilar and bibasilar    opacities. The pulmonary vasculature appears somewhat prominent and    equalized. Findings are consistent with pulmonary vascular congestion. Bibasilar changes may represent asymmetric edema or atelectasis. Please    correlate clinically for potential subtle infection. No large pleural    effusion. Other diagnostic test:      PFTs: None on file      Echo: 2/2020: [place]:  SUMMARY:     1. Left ventricle: The cavity size was normal. Wall thickness was      normal. Systolic function was mildly reduced. The estimated      ejection fraction was in the range of 45% to 50%.  Wall motion      was normal; there were no regional wall motion abnormalities.      Doppler parameters are consistent with abnormal left ventricular      relaxation (grade 1 diastolic dysfunction). 2. Ventricular septum: There was a probable ventricular septal      defect in the perimembranous region. It does not appear to be      hemodynamically significant. Likely an incidental finding. There      was a ventricular level shunt at 10 o'clock (in reference to the      aortic root in the PSAX view). 3. Left atrium: The atrium was moderately dilated. 4. Right ventricle: The cavity size was at the upper limits of      normal. Wall thickness was normal. Systolic function was mildly      reduced. 5. Pericardium, extracardiac: There was no pericardial effusion. Assessment/Plan   52 y.o. male with acute on chronic hypercapnic respiratory failure 2/2. Etiology for his decompensation is a bit unclear and his inability or unwillingness to provide any history that might result in him having to stay in the hospital longer makes it challenging. Initial studies were venous blood gases so can be subject to significant air depending on how they are drawn. ABG this morning showed better compensation with a pH of 7.33 and PCO2 of 64. I suspect he has obesity hypoventilation and obstructive sleep apnea but has never been tested. He does smoke 10 cigars a day as he may have an element of bronchospasm as well. He did not have a urine drug screen on this admission but in the past he has tested positive for cannabis and benzodiazepines. He even tested positive for cocaine back in April 2020 through the [facility]. If he had had any benzodiazepines or cocaine in his system those both could potentially cause hypoventilation issues, but by different mechanisms.     He was not wheezing for my examination and I removed him from the BiPAP to have a conversation with him and prevent him from breaking the BiPAP mask in his attempts to remove it. I was in the room with Dr. Brit Kumar for what felt like much longer but was likely about 20 minutes. His saturation never went below 93% on room air. His procalcitonin was normal as was his chest x-ray so I am comfortable stopping the antibiotics. He is not wheezing on my examination but has had breathing treatments and prednisone, so that might be why he is improved. I am going to check a urine drug screen as mentioned above. Empirically treating him as an exacerbation of COPD or asthma is reasonable however I am concerned about how the steroids will affect his mood. Based on his ABGs he would be a reasonable candidate for noninvasive ventilation or at minimum sleep apnea evaluation but I do not think that there is any chance he would ever use one outside of the hospital and likely would just aggravate him.        Topher Adhikari MD

## 2021-06-28 NOTE — DISCHARGE INSTR - COC
Continuity of Care Form    Patient Name: Cari Buckner   :  1970  MRN:  [id number]    Admit date:  2021  Discharge date:  2021    Code Status Order: Full Code   Advance Directives:   [facility] Documentation       Date/Time Healthcare Directive Type of Healthcare Directive Copy in [address] [place] [address] [address] Agent's Name Healthcare Agent's Phone Number    21 1233  Unknown, patient unable to respond due to medical condition -- -- -- -- --            Admitting Physician:  Karl Menard MD  PCP: No primary care provider on file. Discharging Nurse: [facility] Unit/Room#: 4642/5670-74  Discharging Unit Phone Number: [phone]    Emergency Contact:   Extended Emergency Contact Information  Primary Emergency Contact: sahara  Home Phone: [phone]  Relation: Other    Past Surgical History:  No past surgical history on file. Immunization History: There is no immunization history on file for this patient. Active Problems:  Patient Active Problem List   Diagnosis Code    Alcohol intoxication ([facility] F10.929    Alcohol intoxication delirium ([facility] F10.121    S/P repair of ventral hernia Z98.890, Z87.19    Paranoid schizophrenia ([address] F20.0    Bipolar 1 disorder ([facility] [address] F31.9    Chronic alcoholic gastritis without hemorrhage K29.20    Atypical chest pain R07.89    Alcohol withdrawal, uncomplicated ([address] J43.773    Obesity (BMI 30-39. 9) E66.9    Acute respiratory failure with hypoxia and hypercapnia (HCC) J96.01, J96.02    Hypoxia R09.02       Isolation/Infection:   Isolation            Droplet Plus          Patient Infection Status       Infection Onset Added Last Indicated Last Indicated By Review Planned Expiration Resolved Resolved By    COVID-19 Rule Out 21 COVID-19 (Ordered) 21              Nurse Assessment:  Last Vital Signs: BP (!) 155/82   Pulse 129   Temp 98 °F (36.7 °C) (Axillary)   Resp 26   Ht 6' 1\" (1.854 m)   Wt (!) 321 lb 14 oz (146 kg)   SpO2 93%   BMI 42.47 kg/m²     Last documented pain score (0-10 scale): Pain Level: 0  Last Weight:   Wt Readings from Last 1 Encounters:   06/28/21 (!) 321 lb 14 oz (146 kg)     Mental Status:  alert and able to concentrate and follow conversation    IV Access:  - None    Nursing Mobility/ADLs:  Walking   Independent  Transfer  Independent  Bathing  Independent  Dressing  Independent  [address] Delivery   whole    Wound Care Documentation and Therapy:        Elimination:  Continence: Bowel: Yes  Bladder: Yes  Urinary Catheter: None   Colostomy/Ileostomy/Ileal Conduit: No       Date of Last BM: 6/29/2021    Intake/Output Summary (Last 24 hours) at 6/28/2021 1420  Last data filed at 6/28/2021 1345  Gross per 24 hour   Intake 600 ml   Output 600 ml   Net 0 ml     No intake/output data recorded. Safety Concerns: At Risk for Falls    Impairments/Disabilities:      None    Nutrition Therapy:  Current Nutrition Therapy:   - Oral Diet:  General    Routes of Feeding: Oral  Liquids: Thin Liquids  Daily Fluid Restriction: no  Last Modified Barium Swallow with Video (Video Swallowing Test): not done    Treatments at the Time of Hospital Discharge:   Respiratory Treatments: Albuterol inhaler Q4H  Oxygen Therapy:  is not on home oxygen therapy.   Ventilator:    - No ventilator support    Rehab Therapies: Physical Therapy and Occupational Therapy  Weight Bearing Status/Restrictions: No weight bearing restirctions  Other Medical Equipment (for information only, NOT a DME order): None  Other Treatments: None    Patient's personal belongings (please select all that are sent with patient):  None    RN SIGNATURE:  Electronically signed by Breonna Gonsales RN on 6/29/21 at 5:21 PM EDT    CASE MANAGEMENT/SOCIAL WORK SECTION    Inpatient Status Date: 06.28.2021    Readmission Risk Assessment Score:  Readmission Risk              Risk of Unplanned Readmission:  16           Discharging to Facility/ Agency   [facility], [address]      / signature: Electronically signed by Corinne Gaytan RN on 7/1/21 at 12:44 PM EDT    PHYSICIAN SECTION    Prognosis: Fair    Condition at Discharge: Stable    Rehab Potential (if transferring to Rehab):NA    Recommended Labs or Other Treatments After Discharge: Follow up psychiatry. Outpatient sleep apnea evaluation or pulmonology evaluation    Physician Certification: I certify the above information and transfer of Seema Morales  is necessary for the continuing treatment of the diagnosis listed and that he requires behavioral health/psychiatric care for greater 30 days.      Update Admission H&P: No change in H&P    PHYSICIAN SIGNATURE:  Electronically signed by Avtar Macedo MD on 07/01/21 at 12:21 PM EDT

## 2021-06-28 NOTE — ED NOTES
Bed: A05-05  Expected date:   Expected time:   Means of arrival: [facility]  Comments:     Sunny Benavidez  06/28/21 0127

## 2021-06-28 NOTE — PLAN OF CARE
Problem: Falls - Risk of:  Goal: Will remain free from falls  Description: Will remain free from falls  Outcome: Ongoing  Note: Pt alert and oriented, able to make needs known. Pt has bed alarm in place. Pt has non skid socks on. Pt has call light within reach. Pt has AVASYS active in room. Problem: Gas Exchange - Impaired:  Goal: Levels of oxygenation will improve  Description: Levels of oxygenation will improve  Outcome: Ongoing  Note: Pt was on BiPAP, Pt has since been removed by Dr. Stephen Bowman this am. Pt has maintained oxygen saturations >90% on room air. Pt still noted to be wheezy on auscultation.       Problem: Breathing Pattern - Ineffective:  Goal: Ability to achieve and maintain a regular respiratory rate will improve  Description: Ability to achieve and maintain a regular respiratory rate will improve  Outcome: Ongoing

## 2021-06-28 NOTE — ED PROVIDER NOTES
[address] [facility] RESIDENT NOTE       Date of evaluation: 6/28/2021    Chief Complaint     Shortness of Breath (c/o awoke from sleep with sob, placed on 2 liters nc by [facility], sent by THE [facility] )      of Present Illness     Tushar Rey is a 52 y.o. male who presents with shortness of breath. I spoke with the staff at the [facility] behavioral health facility who stated that the patient came out of his room and had labored breathing and appeared diaphoretic. He was administered his as needed inhalers which did not result in any significant improvement. His oxygen saturation there was in the mid 80s, increasing to the mid 90s after being placed on nasal cannula 2 L. The patient states that he feels short of breath. States that he has pain throughout his body which is chronic and unchanged. He denies any pain in his chest.  He has not noticed any pain or swelling in his legs. He does not believe that he has any history of DVT or PE. He is unable to answer if he has had any fevers or chills. Additional history is limited by the patient's comorbid psychiatric conditions which complicate obtaining a detailed history. Review of Systems     Review of Systems   Unable to perform ROS: Psychiatric disorder   Respiratory: Positive for shortness of breath. Cardiovascular: Negative for chest pain. Gastrointestinal: Negative for nausea and vomiting. Pertinent positive and negative findings as documented in the HPI. Otherwise all other systems were reviewed and were negative. Past Medical, Surgical, Family, and Social History     He has a past medical history of Alcoholism ([facility] [address] Bipolar 1 disorder ([address] Hypertension, and Schizophrenia ([facility] [address] He has no past surgical history on file. His Family history is unknown by patient. He reports that he has been smoking cigarettes. He has been smoking about 0.50 packs per day.  He has never used smokeless tobacco. He reports current alcohol use. He reports current drug use. Drugs: Marijuana and Methamphetamines. Medications     Previous Medications    BUSPIRONE (BUSPAR) 30 MG TABLET    Take 30 mg by mouth 2 times daily    SUCRALFATE (CARAFATE) 1 GM TABLET    Take 1 tablet by mouth 4 times daily for 7 days       Allergies     He is allergic to haldol [haloperidol], penicillins, and zyprexa [olanzapine]. Physical Exam     INITIAL VITALS: BP: 132/82, Temp: 97.6 °F (36.4 °C), Pulse: 108, Resp: (!) 32, SpO2: 99 %   Physical Exam  Vitals and nursing note reviewed. Constitutional:       Appearance: He is well-developed. He is obese. HENT:      Head: Normocephalic and atraumatic. Eyes:      Extraocular Movements: Extraocular movements intact. Cardiovascular:      Rate and Rhythm: Tachycardia present. Heart sounds: No murmur heard. No friction rub. No gallop. Pulmonary:      Comments: Coarse breath sounds bilaterally. No wheezing. Mildly increased work of breathing. Chest:      Chest wall: No tenderness. Abdominal:      Palpations: Abdomen is soft. Tenderness: There is no abdominal tenderness. There is no guarding or rebound. Musculoskeletal:      Cervical back: Normal range of motion and neck supple. Right lower leg: No tenderness. No edema. Left lower leg: No tenderness. No edema. Skin:     General: Skin is warm and dry. Neurological:      Mental Status: He is alert. Comments: Slurred speech. Answering questions generally appropriately. Moving all 4 extremities to command.    Psychiatric:         Mood and Affect: Mood normal.         DiagnosticResults     EKG   Interpreted in conjunction with emergencydepartment physician Dori Serrano MD  Rhythm: sinus tachycardia  Rate: 100-110  Axis: normal  Ectopy: none  Conduction: normal  ST Segments: nonspecific changes  T Waves:non specific changes  Q Waves: none  Clinical Impression: non-specific EKG  Comparison: Nonspecific ST-T wave change compared to previous EKG (3/25/2019)    RADIOLOGY:  XR CHEST PORTABLE    (Results Pending)       LABS:   No results found for this visit on 06/28/21. ED BEDSIDE ULTRASOUND:  None    RECENT VITALS:  BP: 132/82, Temp: 97.6 °F (36.4 °C), Pulse: 108,Resp: (!) 32, SpO2: 99 %     Procedures     None    ED Course     Nursing Notes, Past Medical Hx, Past Surgical Hx, Social Hx, Allergies, and Family Hx were reviewed. The patient was given the followingmedications:  No orders of the defined types were placed in this encounter. CONSULTS:  None    MEDICAL DECISION MAKING / ASSESSMENT / PLAN     Vilma Escalante is a 52 y.o. male presenting with shortness of breath. Patient on arrival to the emergency department, the patient is afebrile but mildly tachycardic normotensive. He is saturating in the 90s on 2 L of nasal cannula which were started at [facility] prior to transfer to the emergency department. On exam, he does have coarse breath sounds bilaterally based my concern for pneumonia. He does not appear grossly volume overloaded. Review of Care Everywhere demonstrates that he has an ejection fraction of 45 to 50% on echo obtained 11/2020 at an outside facility. Covid and influenza swabs are ordered. Lab work is notable for renal panel with no concerning electrolyte abnormalities. CBC reveals no leukocytosis. He is mildly anemic to hemoglobin of 12.3. EKG demonstrates nonspecific ST-T wave changes that are concerning for ongoing ischemia. Troponin is undetectable. NT proBNP is normal at 36, the patient does not appear to be volume overloaded. VBG demonstrates respiratory acidosis with pH of 7.27 with PCO2 of 78. The patient will be started on BiPAP to address his respiratory acidosis. The patient was signed out to my attending, Dr. Reina Mancera. Steps remaining in the patient's care include following up on chest x-ray.   Anticipate likely admission for pneumonia with new oxygen requirement. This patient was also evaluated by the attending physician. All care plans werediscussed and agreed upon. Clinical Impression     Pneumonia    Disposition     PATIENT REFERRED TO:  No follow-up provider specified.     DISCHARGE MEDICATIONS:  New Prescriptions    No medications on file       Nayla Ralph MD  Resident  06/28/21 5761

## 2021-06-28 NOTE — ED NOTES
Report called to PCU RN, transfer to floor with bipap, RT, RN and tele      Sury Hart RN  06/28/21 4187

## 2021-06-29 LAB
ANION GAP SERPL CALCULATED.3IONS-SCNC: 8 MMOL/L (ref 3–16)
BUN BLDV-MCNC: 8 MG/DL (ref 7–20)
CALCIUM SERPL-MCNC: 9 MG/DL (ref 8.3–10.6)
CHLORIDE BLD-SCNC: 101 MMOL/L (ref 99–110)
CO2: 31 MMOL/L (ref 21–32)
CREAT SERPL-MCNC: 0.7 MG/DL (ref 0.9–1.3)
GFR AFRICAN AMERICAN: >60
GFR NON-AFRICAN AMERICAN: >60
GLUCOSE BLD-MCNC: 132 MG/DL (ref 70–99)
POTASSIUM REFLEX MAGNESIUM: 3.7 MMOL/L (ref 3.5–5.1)
SODIUM BLD-SCNC: 140 MMOL/L (ref 136–145)

## 2021-06-29 PROCEDURE — 6370000000 HC RX 637 (ALT 250 FOR IP): Performed by: INTERNAL MEDICINE

## 2021-06-29 PROCEDURE — 94640 AIRWAY INHALATION TREATMENT: CPT

## 2021-06-29 PROCEDURE — 80048 BASIC METABOLIC PNL TOTAL CA: CPT

## 2021-06-29 PROCEDURE — 94761 N-INVAS EAR/PLS OXIMETRY MLT: CPT

## 2021-06-29 PROCEDURE — 6360000002 HC RX W HCPCS: Performed by: INTERNAL MEDICINE

## 2021-06-29 PROCEDURE — 90792 PSYCH DIAG EVAL W/MED SRVCS: CPT | Performed by: NURSE PRACTITIONER

## 2021-06-29 PROCEDURE — 36415 COLL VENOUS BLD VENIPUNCTURE: CPT

## 2021-06-29 PROCEDURE — 99232 SBSQ HOSP IP/OBS MODERATE 35: CPT | Performed by: INTERNAL MEDICINE

## 2021-06-29 PROCEDURE — 96372 THER/PROPH/DIAG INJ SC/IM: CPT

## 2021-06-29 PROCEDURE — 2580000003 HC RX 258: Performed by: INTERNAL MEDICINE

## 2021-06-29 PROCEDURE — G0378 HOSPITAL OBSERVATION PER HR: HCPCS

## 2021-06-29 RX ORDER — GABAPENTIN 300 MG/1
300 CAPSULE ORAL 4 TIMES DAILY
Qty: 120 CAPSULE | Refills: 0 | Status: SHIPPED | OUTPATIENT
Start: 2021-06-29 | End: 2021-07-01 | Stop reason: SDUPTHER

## 2021-06-29 RX ORDER — CLONAZEPAM 1 MG/1
1 TABLET ORAL 3 TIMES DAILY
Qty: 9 TABLET | Refills: 0 | Status: SHIPPED | OUTPATIENT
Start: 2021-06-29 | End: 2021-07-01 | Stop reason: SDUPTHER

## 2021-06-29 RX ORDER — TRAZODONE HYDROCHLORIDE 50 MG/1
50 TABLET ORAL NIGHTLY PRN
Qty: 30 TABLET | Refills: 0 | Status: SHIPPED | OUTPATIENT
Start: 2021-06-29 | End: 2021-07-01 | Stop reason: SDUPTHER

## 2021-06-29 RX ADMIN — CARBAMAZEPINE 200 MG: 200 TABLET ORAL at 21:22

## 2021-06-29 RX ADMIN — ENOXAPARIN SODIUM 40 MG: 40 INJECTION SUBCUTANEOUS at 08:37

## 2021-06-29 RX ADMIN — CARBAMAZEPINE 200 MG: 200 TABLET ORAL at 08:37

## 2021-06-29 RX ADMIN — IPRATROPIUM BROMIDE AND ALBUTEROL 1 PUFF: 20; 100 SPRAY, METERED RESPIRATORY (INHALATION) at 22:29

## 2021-06-29 RX ADMIN — FUROSEMIDE 40 MG: 40 TABLET ORAL at 08:36

## 2021-06-29 RX ADMIN — SPIRONOLACTONE 50 MG: 50 TABLET ORAL at 08:36

## 2021-06-29 RX ADMIN — PREDNISONE 40 MG: 20 TABLET ORAL at 08:35

## 2021-06-29 RX ADMIN — Medication 10 ML: at 21:23

## 2021-06-29 RX ADMIN — FUROSEMIDE 40 MG: 40 TABLET ORAL at 18:53

## 2021-06-29 RX ADMIN — POTASSIUM CHLORIDE 20 MEQ: 750 TABLET, EXTENDED RELEASE ORAL at 08:36

## 2021-06-29 RX ADMIN — RIFAXIMIN 550 MG: 550 TABLET ORAL at 21:22

## 2021-06-29 RX ADMIN — FENOFIBRATE 54 MG: 54 TABLET ORAL at 08:35

## 2021-06-29 RX ADMIN — IPRATROPIUM BROMIDE AND ALBUTEROL 1 PUFF: 20; 100 SPRAY, METERED RESPIRATORY (INHALATION) at 16:19

## 2021-06-29 RX ADMIN — CLONAZEPAM 1 MG: 1 TABLET ORAL at 08:37

## 2021-06-29 RX ADMIN — PROPRANOLOL HYDROCHLORIDE 10 MG: 20 TABLET ORAL at 14:59

## 2021-06-29 RX ADMIN — TRAZODONE HYDROCHLORIDE 50 MG: 50 TABLET ORAL at 21:24

## 2021-06-29 RX ADMIN — Medication 100 MG: at 08:36

## 2021-06-29 RX ADMIN — PROPRANOLOL HYDROCHLORIDE 10 MG: 20 TABLET ORAL at 08:36

## 2021-06-29 RX ADMIN — SPIRONOLACTONE 50 MG: 50 TABLET ORAL at 20:11

## 2021-06-29 RX ADMIN — Medication 10 ML: at 08:53

## 2021-06-29 RX ADMIN — IPRATROPIUM BROMIDE AND ALBUTEROL 1 PUFF: 20; 100 SPRAY, METERED RESPIRATORY (INHALATION) at 09:39

## 2021-06-29 RX ADMIN — CARBAMAZEPINE 200 MG: 200 TABLET ORAL at 14:59

## 2021-06-29 RX ADMIN — RIFAXIMIN 550 MG: 550 TABLET ORAL at 08:36

## 2021-06-29 RX ADMIN — CLONAZEPAM 1 MG: 1 TABLET ORAL at 14:59

## 2021-06-29 RX ADMIN — IPRATROPIUM BROMIDE AND ALBUTEROL 1 PUFF: 20; 100 SPRAY, METERED RESPIRATORY (INHALATION) at 12:24

## 2021-06-29 RX ADMIN — PROPRANOLOL HYDROCHLORIDE 10 MG: 20 TABLET ORAL at 21:22

## 2021-06-29 RX ADMIN — FAMOTIDINE 20 MG: 20 TABLET ORAL at 08:36

## 2021-06-29 RX ADMIN — CLONAZEPAM 1 MG: 1 TABLET ORAL at 21:22

## 2021-06-29 ASSESSMENT — PAIN SCALES - GENERAL
PAINLEVEL_OUTOF10: 0
PAINLEVEL_OUTOF10: 10
PAINLEVEL_OUTOF10: 0
PAINLEVEL_OUTOF10: 0

## 2021-06-29 NOTE — PROGRESS NOTES
Hospitalist Progress Note      PCP: No primary care provider on file. Date of Admission: 6/28/2021    Chief Complaint: Shortness of breath    Hospital Course: Admitted for acute on chronic hypoxic/hypercapnic respiratory failure secondary to possible underlying obesity hypoventilation/obstructive sleep apnea or COPD/asthma. Patient was placed on BiPAP briefly and improved. On room air and p.o. steroids. Psych consult pending. Subjective: Patient worked up about wanting to leave. Patient does not seem to comprehend the situation completely. He thinks that he still can go back to [address] [place] [address]. Discussed with patient at length and explained to him regarding discharge plan along with Dr. Ruben Troncoso. Discussed with RN. Discussed with social work.       Medications:  Reviewed    Infusion Medications    sodium chloride       Scheduled Medications    sodium chloride flush  5-40 mL Intravenous 2 times per day    enoxaparin  40 mg Subcutaneous Daily    predniSONE  40 mg Oral Daily    carBAMazepine  200 mg Oral TID    clonazePAM  1 mg Oral TID    famotidine  20 mg Oral Daily    fenofibrate  54 mg Oral Daily    lactulose  20 g Oral BID    nicotine  1 patch Transdermal Q24H    vitamin B-1  100 mg Oral Daily    potassium chloride  20 mEq Oral Daily    furosemide  40 mg Oral BID    propranolol  10 mg Oral TID    rifaximin  550 mg Oral BID    spironolactone  50 mg Oral BID    albuterol-ipratropium  1 puff Inhalation Q4H While awake     PRN Meds: albuterol, sodium chloride flush, sodium chloride, ondansetron **OR** ondansetron, polyethylene glycol, acetaminophen **OR** acetaminophen, albuterol, aluminum & magnesium hydroxide-simethicone, ziprasidone, traZODone      Intake/Output Summary (Last 24 hours) at 6/29/2021 0953  Last data filed at 6/29/2021 0600  Gross per 24 hour   Intake 1360 ml   Output 1800 ml   Net -440 ml       Physical Exam Performed:    BP (!) 161/93   Pulse 101   Temp 97.1 °F (36.2 °C) (Oral)   Resp (!) 32   Ht 6' 1\" (1.854 m)   Wt (!) 321 lb 14 oz (146 kg)   SpO2 93%   BMI 42.47 kg/m²     General appearance:  No apparent distress, appears stated age and cooperative. Morbidly obese  HEENT:  Normal cephalic, atraumatic without obvious deformity. Pupils equal, round, and reactive to light. Extra ocular muscles intact. Conjunctivae/corneas clear. Neck: Supple, with full range of motion. No jugular venous distention. Trachea midline. Respiratory: Decreased breath sounds bilaterally  Cardiovascular:  Regular rate and rhythm with normal S1/S2 without murmurs, rubs or gallops. Abdomen: Soft, non-tender, non-distended with normal bowel sounds. Musculoskeletal:  No clubbing, cyanosis or edema bilaterally. Full range of motion without deformity. Skin: Skin color, texture, turgor normal.  No rashes or lesions. Neurologic: Grossly non-focal.  Moving all extremities spontaneously  Psychiatric:  Alert and oriented. Anxious, agitated  Capillary Refill: Brisk,< 3 seconds   Peripheral Pulses: +2 palpable, equal bilaterally       Labs:   Recent Labs     06/28/21  0205   WBC 8.4   HGB 12.3*   HCT 37.8*        Recent Labs     06/28/21 0205 06/29/21  0501    140   K 4.2 3.7    101   CO2 31 31   BUN 8 8   CREATININE 0.8* 0.7*   CALCIUM 8.7 9.0     No results for input(s): AST, ALT, BILIDIR, BILITOT, ALKPHOS in the last 72 hours. No results for input(s): INR in the last 72 hours. Recent Labs     06/28/21 0205   TROPONINI <0.01       Urinalysis:      Lab Results   Component Value Date    NITRU Negative 09/11/2019    WBCUA 0 09/11/2019    RBCUA 0 09/11/2019    BLOODU Negative 09/11/2019    SPECGRAV 1.010 09/11/2019    GLUCOSEU Negative 09/11/2019       Radiology:  XR CHEST PORTABLE   Final Result     Diminished lung volumes with subsegmental perihilar and bibasilar    opacities. The pulmonary vasculature appears somewhat prominent and    equalized.   Findings are consistent with pulmonary vascular congestion. Bibasilar changes may represent asymmetric edema or atelectasis. Please    correlate clinically for potential subtle infection. No large pleural    effusion. Assessment/Plan:  Acute on chronic hypoxic/hypercapnic respiratory failure:   Etiology unclear  Might be secondary to underlying obesity hypoventilation/obstructive sleep apnea or possible COPD/asthma exacerbation  Urine drug screen negative  Procalcitonin level. Antibiotic discontinued. No concern for pneumonia  Patient was placed on BiPAP initially but does not want to wear it  Patient taken off of BiPAP and maintaining sats above 90% on room air  Continue to monitor sats  Continue p.o. steroids and breathing treatment as needed  Pulmonology following, appreciate recommendations     Chronic systolic heart failure/hypertension:  EF of 45 to 50% 02/20  Continue home diuretics     History of cirrhosis:  Continue lactulose, Xifaxan and propanolol     Seizure disorder:  Continue home medication     Bipolar disorder/schizophrenia with behavioral disturbances:  Patient was sent from [address] [place] [address] antipsychotic medication  Psychiatry consulted, awaiting recs        DVT Prophylaxis: Lovenox  Diet: ADULT DIET;  Regular  Code Status: Full Code    PT/OT Eval Status: Ordered    Dispo -pending psych eval, placement    Alisha Herrera MD

## 2021-06-29 NOTE — CARE COORDINATION
This patient, even though has a OBS status, had been inpatient before and must have a psych consult to be considered to return to [facility]. This patient has shown definite behavior issues (verbally aggressive-threatening to cause problems if not allowed to be discharged) while hospitalized here at [address] is awaiting psych eval for further discharge planning needs. CM will continue to follow patient until discharge.  Electronically signed by Dorota Wang RN on 6/29/2021 at 10:13 AM

## 2021-06-29 NOTE — CARE COORDINATION
[address] DON spoke to this CM's supervisor, Lalo Garcia (and Dr. Catalina Pickard), and DON said they would take them back with updated referral notes since this patient was never and inpatient. Transportation to [facility] today at 10:30 pm. This CM made multiple calls to the staff (admissions-intake) to get them to understand that patient was never an inpatient at this hospital. Finally, the  at the facility agreed that patient would return with just note updates, COVID results, and facesheet. Case Management Assessment            Discharge Note                    Date / Time of Note: 6/29/2021 5:23 PM                  Discharge Note Completed by: Kendrick Murphy RN    Patient Name: Akil Gaspar   YOB: 1970  Diagnosis: Acute respiratory failure with hypoxia and hypercapnia ([facility] [address] [J96.01, J96.02]  Hypoxia [R09.02]   Date / Time: 6/28/2021  1:27 AM    Current PCP: No primary care provider on file. Clinic patient: No    Hospitalization in the last 30 days: Yes    Advance Directives:  Code Status: Full Code  [place] DNR form completed and on chart: Not Indicated    Financial:  Payor: Rachid Reich / Plan: Sol Nix / Product Type: *No Product type* /      Pharmacy:    [address] [facility] [address]  Phone: [phone] Fax: [phone]      Assistance purchasing medications?: Potential Assistance Purchasing Medications: No  Assistance provided by Case Management: None at this time    Does patient want to participate in local refill/ meds to beds program?: Not Assessed    Meds To Beds General Rules:  1. Can ONLY be done Monday- Friday between 8:30am-5pm  2. Prescription(s) must be in pharmacy by 3pm to be filled same day  3. Copy of patient's insurance/ prescription drug card and patient face sheet must be sent along with the prescription(s)  4.  Cost of Rx cannot be added to hospital bill. If financial assistance is needed, please contact unit  or ;  or  CANNOT provide pharmacy voucher for patients co-pays  5. Patients can then  the prescription on their way out of the hospital at discharge, or pharmacy can deliver to the bedside if staff is available. (payment due at time of pick-up or delivery - cash, check, or card accepted)     Able to afford home medications/ co-pay costs: [facility] and SNF    ADLS:  Current PT AM-PAC Score:   /24  Current OT AM-PAC Score:   /24      DISCHARGE Disposition: Inpatient [facility]: [address] Phone: [phone] Fax: [phone]    LOC at discharge: Not Applicable  JANES Completed: Yes    Notification completed in HENS/PAS?:  Not Applicable    IMM Completed:   Not Indicated    Transportation:  Transportation PLAN for discharge: EMS transportation   Mode of Transport: Ambulance stretcher - BLS  Reason for medical transport: Other: TBI/psych patient  Name of [address]: [address] [facility]   Phone: [phone]  Time of Transport: 10:30 pm      Transport form completed: Yes    Home Care:  Home Care ordered at discharge: Not Indicated  Home Care Agency: Not Applicable  Orders faxed: No      Additional CM Notes: see above    The Plan for Transition of Care is related to the following treatment goals of Acute respiratory failure with hypoxia and hypercapnia ([facility] [address] [J96.01, J96.02]  Hypoxia [R09.02]    The Patient and/or patient representative Ana Urena and his family were provided with a choice of provider and agrees with the discharge plan Yes    Freedom of choice list was provided with basic dialogue that supports the patient's individualized plan of care/goals and shares the quality data associated with the providers.  Yes    Care Transitions patient: No    Gabriel Rodriguez RN  [facility]  Case Management Department  Ph: [phone]

## 2021-06-29 NOTE — CONSULTS
Psychiatry Initial Consultation    Patient Name: Jasper Marroquin  MRN: [id number]  Admission Date: 6/28/2021    Reason for Consult: Bipolar disorder, from [facility]    HPI:   Jasper Marroquin is a 52year old male who presented to the hospital on 06/28/2021 with a chief complaint of shortness of breath. Per ED documentation, I spoke with the staff at the [facility] behavioral health facility who stated that the patient came out of his room and had labored breathing and appeared diaphoretic. He was administered his as needed inhalers which did not result in any significant improvement. His oxygen saturation there was in the mid 80s, increasing to the mid 90s after being placed on nasal cannula 2 L. The patient states that he feels short of breath. States that he has pain throughout his body which is chronic and unchanged. He denies any pain in his chest.  He has not noticed any pain or swelling in his legs. He does not believe that he has any history of DVT or PE. He is unable to answer if he has had any fevers or chills. Additional history is limited by the patient's comorbid psychiatric conditions which complicate obtaining a detailed history. Met with Jey Brady. He was alert, had some underlying irritability and did raise his voice slightly during interactions with me, although he acknowledged it and apologized for it, \"I just talk this way\". Much of his conversation perseverated on the fact that he left his shoes at [facility] and he thought the DON was going to bring them to the hospital for him. He notes he was previously living at [facility] but \"they're trying to kick me out because they say I'm intimidating due to my size\". He appeared to have minimal insight into his actions and reported behaviors that warranted his admission to [facility]. He noted in conversation that he was seeing [address]" and that they were moving around the room and \"they're following me wherever I look\".     Called [facility] for collateral, spoke to Mahad. Mahad reports that he was admitted to [facility] on 06/23/2021 from the [address] [place] [address] due to verbal aggression toward staff and peers. He reportedly had an allergic reaction to Zyprexa a few weeks ago and was noncompliant with his medications after that. While he was at [facility], he was reportedly given Prateek Dye and his Tegretol was increased to 200 mg TID. She states that he can be very loud and demanding, becomes easily irritated if he feels he is disrespected. He did not exhibit any physical aggression during his stay at [facility]. She states they are familiar with Loilanalen  from previous admissions and that his current presentation is his baseline. He was scheduled to discharge from [facility] back to [address] [place] [address] yesterday. [facility] is reportedly trying to find him new placement but he was planned to return there at this time. Duration: Ongoing   Severity: Moderate-Severe  Context: Stress, medical issues, recent change in medication  Associated Symptoms: As above     Past Psychiatric History:    Patient's past psychiatric history is widely unknown due to current presentation as much of his conversation was perseverative on his shoes. Chart review indicates that he has previous diagnoses of bipolar disorder, schizophrenia, schizoaffective disorder, anxiety, PTSD. He also has a history of a TBI. Previous psychiatric hospitalizations include [facility] (x4), [facility], [facility]. Has done substance abuse treatment at [address]. Previous medication trials include Buspar, Haldol (listed as allergy), Inevga, Invega Sustenna, Klonopin, Lexapro, Prazosin, Remeron, Seroquel, Trazodone, Tegretol, Trileptal, Vistaril, Zyprexa (listed as allergy). He does have a history of SI documented but unknown if he has had previous attempts. His history is significant for agitation and aggressive behavior.     Past Medical History:  Past Medical History:   Diagnosis Date    Aggressive behavior     Alcoholism ([facility]     Angina pectoris ([facility]     Anxiety     Bipolar 1 disorder ([facility]     Bipolar disorder (HCC)     Chronic pain     Cirrhosis ([facility]     Heart murmur     Hernia of abdominal wall     Hypertension     PTSD (post-traumatic stress disorder)     Schizophrenia (HCC)     Seizures (HCC)     Thiamine deficiency     Traumatic brain injury ([facility]      Home Medications:  Prior to Admission medications    Medication Sig Start Date End Date Taking? Authorizing Provider   rifaximin (XIFAXAN) 550 MG tablet Take 550 mg by mouth 2 times daily   Yes Historical Provider, MD   lidocaine (LIDODERM) 5 % Place 1 patch onto the skin daily 12 hours on, 12 hours off. Yes Historical Provider, MD   potassium chloride (MICRO-K) 10 MEQ extended release capsule Take 20 mEq by mouth daily   Yes Historical Provider, MD   fenofibrate (TRICOR) 48 MG tablet Take 48 mg by mouth daily   Yes Historical Provider, MD   famotidine (PEPCID) 20 MG tablet Take 20 mg by mouth daily   Yes Historical Provider, MD   vitamin B-1 (THIAMINE) 100 MG tablet Take 100 mg by mouth daily   Yes Historical Provider, MD   nicotine (NICODERM CQ) 21 MG/24HR Place 1 patch onto the skin every 24 hours   Yes Historical Provider, MD   spironolactone (ALDACTONE) 25 MG tablet Take 50 mg by mouth 2 times daily   Yes Historical Provider, MD   lactulose (CHRONULAC) 10 GM/15ML solution Take 20 g by mouth 2 times daily   Yes Historical Provider, MD   furosemide (LASIX) 40 MG tablet Take 40 mg by mouth 2 times daily   Yes Historical Provider, MD   carBAMazepine (TEGRETOL) 200 MG tablet Take 200 mg by mouth 3 times daily   Yes Historical Provider, MD   propranolol (INDERAL) 20 MG tablet Take 10 mg by mouth 3 times daily   Yes Historical Provider, MD   clonazePAM (KLONOPIN) 1 MG tablet Take 1 mg by mouth 3 times daily. Yes Historical Provider, MD   gabapentin (NEURONTIN) 300 MG capsule Take 300 mg by mouth 4 times daily.    Yes Historical Provider, MD   albuterol sulfate HFA (VENTOLIN HFA) 108 (90 Base) MCG/ACT inhaler Inhale 2 puffs into the lungs every 6 hours as needed for Wheezing   Yes Historical Provider, MD   nitroGLYCERIN (NITROSTAT) 0.4 MG SL tablet Place 0.4 mg under the tongue every 5 minutes as needed for Chest pain up to max of 3 total doses. If no relief after 1 dose, call 911. Yes Historical Provider, MD   ziprasidone (GEODON) 20 MG injection Inject 20 mg into the muscle 2 times daily as needed (psychosis)   Yes Historical Provider, MD   traZODone (DESYREL) 50 MG tablet Take 50 mg by mouth nightly as needed for Sleep   Yes Historical Provider, MD   magnesium hydroxide (MILK OF MAGNESIA) 400 MG/5ML suspension Take 30 mLs by mouth daily as needed for Constipation   Yes Historical Provider, MD   aluminum & magnesium hydroxide-simethicone (MYLANTA) 400-400-40 MG/5ML SUSP Take 30 mLs by mouth every 4 hours as needed   Yes Historical Provider, MD   acetaminophen (TYLENOL) 325 MG tablet Take 650 mg by mouth every 6 hours as needed for Pain   Yes Historical Provider, MD   predniSONE (DELTASONE) 20 MG tablet Take 2 tablets by mouth daily for 4 days 6/29/21 7/3/21 Yes Jenna Lyles MD     Chemical Dependency History:   Tobacco: Per Epic, current some day smoker  Alcohol: Per Epic, 4-6 24 oz beers daily  Illicit: Per Epic, methamphetamines and marijuana    Family Hx:    Family History   Family history unknown: Yes      Social Hx:   Patient's social history is widely unknown. He is listed in Epic as single, no children listed. He arrived to us from [facility] and was reportedly living at [facility] in the past but no longer lives there. He is unemployed. No known history of abuse. No known legal issues.      Current Medications Ordered:   sodium chloride flush  5-40 mL Intravenous 2 times per day    enoxaparin  40 mg Subcutaneous Daily    predniSONE  40 mg Oral Daily    carBAMazepine  200 mg Oral TID    clonazePAM  1 mg Oral TID    famotidine 20 mg Oral Daily    fenofibrate  54 mg Oral Daily    lactulose  20 g Oral BID    nicotine  1 patch Transdermal Q24H    vitamin B-1  100 mg Oral Daily    potassium chloride  20 mEq Oral Daily    furosemide  40 mg Oral BID    propranolol  10 mg Oral TID    rifaximin  550 mg Oral BID    spironolactone  50 mg Oral BID    albuterol-ipratropium  1 puff Inhalation Q4H While awake      PRN Meds: albuterol, sodium chloride flush, sodium chloride, ondansetron **OR** ondansetron, polyethylene glycol, acetaminophen **OR** acetaminophen, albuterol, aluminum & magnesium hydroxide-simethicone, ziprasidone, traZODone     ROS: See Medical H&PE    PE:    BP (!) 153/95   Pulse 106   Temp 97.6 °F (36.4 °C) (Oral)   Resp (!) 34   Ht 6' 1\" (1.854 m)   Wt (!) 321 lb 14 oz (146 kg)   SpO2 94%   BMI 42.47 kg/m²       Motor / Gait: Observed seated in bed, no abnormal or involuntary movements noted  AIMS: 0    Mental Status Examination:    Appearance: M, appears stated age, lying in bed, wearing personal attire, fair grooming and fair hygiene  Behavior/Attitude Toward Examiner: Overall cooperative, good eye contact  Speech: Spontaneous, normal rate, increased volume  Mood: \"Okay\"  Affect: Mood congruent   Thought Processes: Perseverative  Thought Content: Denies SI, no HI verbalized, focused on his shoes that he reportedly left at [facility]  Perceptions: Reported he was seeing [place] floaters\"  Attention: Limited  Cognition: Alert and oriented to person, place, time, and situation, immediate, recent, and remote recall limited  Insight: Impaired insight   Judgment: Impaired judgment      LAB: Reviewed all labs obtained during admission to date.       Dx:   Primary Psychiatric (DSM V) Diagnosis: Schizoaffective disorder  Secondary Psychiatric (DSM V) Diagnoses: Anxiety, PTSD per history  Chemical Dependency Diagnoses: History of alcohol, methamphetamine, cannabis use    Assessment  Reviewed nursing and ancillary staff notes since arrival to hospital, reviewed previous records and records available through [facility], psychiatric history found. Evaluated medications and assessed for side effects and effectiveness. Assessed patient's educational needs including reviewing plan of care, medications, and diagnosis. Recommendations:    1. Javier Sharma does not current present as imminent risk of harm to self or others, and does not have issues caring for himself in the community that would warrant an admission to inpatient psychiatry at this time. 2. Collateral information obtained from [facility] indicates that his current presentation is his baseline. Javier Sharma was scheduled for discharge from [facility] to return to [address] [place] [address] yesterday morning but was instead sent to [facility] due to shortness of breath. Spent >80 minutes face to face with patient of which >50% was spent counseling and providing education regarding diagnosis, treatment options, and prognosis. Thank you for consult. Please do not hesitate to contact provider if there are additional questions regarding patient.     Addison Perla, MPH, PMHNP-BC  06/29/21

## 2021-06-29 NOTE — CARE COORDINATION
[address]  stated that they can take this patient at 10:30 pm tonight back to his SNF- [facility]. Called and left message for [facility] admissions. (See notes of Perla Bejarano, APRN). This CM awaiting call back from [address] [place] [address]. CM will continue to follow patient until discharge. Electronically signed by Marium Denton RN on 2021 at 3:43 PM     Addendum:   Spoke to patient. Patient is in good spirits at this time. Pleasantly conversed about going back to the [address]. He is concerned that people do not like him there, but he wants to go back. Told him the time of stretcher transport. He is in agreement to go back to [address] [place] [address]. Electronically signed by Marium Denton RN on 2021 at 4:02 PM     Addendum:  Mark Potter (pt.'s nurse), Shayan Waite (charge nurse) and this CM explained the process of having to go back to [facility] for insurance purposes and that patient was never an inpatient. Shayan Waite remained with patient to explain this further that [facility] is the discharge destination. Change in discharge plan. Because patient was OBS only, they must go back to [facility] (see CM discharge notes.) [facility] just, now, called and stated that because we had patient in OBS for so long and their 72 hour hold  that they have to have a 72 hour hold filled out by our physician to return. They stated that they talked to our nursing supervisor about this. Explained this process to Mark Potter, patient's nurse and she is getting 72 hour hold printed out by [facility] and having the attending fill out and sign it, faxing it to [facility] (gave Mark Potter the fax#) and having original sent with the patient.  Electronically signed by Marium Denton RN on 2021 at 6:22 PM

## 2021-06-29 NOTE — PLAN OF CARE
Problem: Gas Exchange - Impaired:  Goal: Levels of oxygenation will improve  Description: Levels of oxygenation will improve  6/29/2021 0251 by Lucho Dwyer RN  Outcome: Ongoing  Note: SPO2 is in upper 80s while sleeping. Placed pt on 2L O2 for the night. Pt declined BIPAP at this time. Will continue to monitor. Problem: Breathing Pattern - Ineffective:  Goal: Ability to achieve and maintain a regular respiratory rate will improve  Description: Ability to achieve and maintain a regular respiratory rate will improve  6/29/2021 0251 by Lucho Dwyer RN  Outcome: Ongoing  Note: Pt is tachypneic. Pt receives  breathing treatments and steroids. Will continue to monitor. =  Problem: Falls - Risk of:  Goal: Will remain free from falls  Description: Will remain free from falls  6/29/2021 0251 by Lucho Dwyer RN  Outcome: Ongoing  Note: Fall precautions are in place. Bed alarm is on and in lowest position. Pt is using call light appropriately. Will continue to monitor.

## 2021-06-29 NOTE — PROGRESS NOTES
Pt becoming increasingly agitated and mildly verbally aggressive, stating that he is being held at the [facility] against his will, and that he needs to be released immediately. He perseverates on a pair of shoes which do not appear to be present at the hospital. Pt appears to be in respiratory distress with RR 34, labored breathing, and conversational dyspnea. Oxygen saturation remains greater than 90% on RA. Breathing treatment administered by RT with improvement of wheezing. Dr. Mary Leong made aware of all above information via perfect serve. No new orders at this time. Charge RN, Grecia Waddell, also aware of current situation. Will update MD as needed with change in condition.

## 2021-06-29 NOTE — PROGRESS NOTES
Pulmonary Followup Note    CC: Acute hypercapnic respiratory failure  Subjective:  Patient denies any acute events overnight. He had intermittent episodes of agitation. He remains on room air and did not require BiPAP again. His Covid was negative. He is desperate to get discharged back to [facility] but he does not have a place there any longer. He came from [facility] but does not seem to understand that he was living there last.  Even though he says he is got a lot of money in his [employer] at [facility]. ROS:  Denies headache, nausea or chest pain. 24HR INTAKE/OUTPUT:      Intake/Output Summary (Last 24 hours) at 2021 1139  Last data filed at 2021 0600  Gross per 24 hour   Intake 1360 ml   Output 1800 ml   Net -440 ml        sodium chloride flush  5-40 mL Intravenous 2 times per day    enoxaparin  40 mg Subcutaneous Daily    predniSONE  40 mg Oral Daily    carBAMazepine  200 mg Oral TID    clonazePAM  1 mg Oral TID    famotidine  20 mg Oral Daily    fenofibrate  54 mg Oral Daily    lactulose  20 g Oral BID    nicotine  1 patch Transdermal Q24H    vitamin B-1  100 mg Oral Daily    potassium chloride  20 mEq Oral Daily    furosemide  40 mg Oral BID    propranolol  10 mg Oral TID    rifaximin  550 mg Oral BID    spironolactone  50 mg Oral BID    albuterol-ipratropium  1 puff Inhalation Q4H While awake           PHYSICAL EXAMINATION:  BP (!) 153/95   Pulse 106   Temp 97.6 °F (36.4 °C) (Oral)   Resp (!) 34   Ht 6' 1\" (1.854 m)   Wt (!) 321 lb 14 oz (146 kg)   SpO2 94%   BMI 42.47 kg/m²   CURRENT PULSE OXIMETRY:  SpO2: 94 %  24HR PULSE OXIMETRY RANGE:  SpO2  Av.6 %  Min: 91 %  Max: 94 % on room air      Gen: Mild emotional distress. Pressured and loud speech with some increased work of breathing.     HEENT: PERRL, EOMI, OP nl  Lung: Clear to auscultation bilaterally with no wheezing, rales, or rhonchi  CV: RRR without M/R/R  Abd: +BS, soft, NT/ND  Ext: No edema. DATA  CBC:   Recent Labs     06/28/21  0205   WBC 8.4   HGB 12.3*   HCT 37.8*   MCV 86.1        BMP:   Recent Labs     06/28/21  0205 06/29/21  0501    140   K 4.2 3.7    101   CO2 31 31   BUN 8 8   CREATININE 0.8* 0.7*     Recent Labs     06/28/21  0947   PHART 7.332*   GTF5OZI 63.9*   PO2ART 94.4     LIVER PROFILE: No results for input(s): AST, ALT, LIPASE, BILIDIR, BILITOT, ALKPHOS in the last 72 hours. Invalid input(s): AMYLASE,  ALB    CXR REVIEWED BY ME AND SHOWED:  XR CHEST PORTABLE   Final Result     Diminished lung volumes with subsegmental perihilar and bibasilar    opacities. The pulmonary vasculature appears somewhat prominent and    equalized. Findings are consistent with pulmonary vascular congestion. Bibasilar changes may represent asymmetric edema or atelectasis. Please    correlate clinically for potential subtle infection. No large pleural    effusion. ASSESSMENT/PLAN:  This is a 52 y.o. male with acute hypercapnic respiratory failure. Urine tox screen was negative yesterday. There was report that he had some wheezing overnight that improved with the nebulizer treatments. He is always been clear for my evaluations but that would allow us to blame the hypercapnia he had on some bronchospasm. He is getting duo nebs every 4 hours. He smokes as an outpatient so COPD is a possibility, or asthma. I do not think that he will wear a BiPAP or noninvasive ventilator, nor be able to participate in a sleep evaluation because of his psychiatric issues. Thus, I do not intend to pursue that work-up. If he does well with duo nebs in the hospital he may do well with Anoro as an outpatient. It is a single dose daily of a LAMA LABA combo. There are also other medications that are twice a day.   He is currently on prednisone but I would not do more than a 5-day burst.    From a medical standpoint patient is appropriate for discharge from my perspective but it appears he is going to be a challenge to place.         Alma Osorio MD

## 2021-06-29 NOTE — PLAN OF CARE
Problem: Falls - Risk of:  Goal: Will remain free from falls  Description: Will remain free from falls  6/29/2021 0811 by Kyle Baker  Outcome: Ongoing   Pt will remain free from accidental injury this shift. Pt has fall risk measures (fall risk bracelet, fall risk sign, non-slip socks, dome light on) in place. Pt bed is in low position, bed alarm on, bed wheels locked, call light within reach, bedside table within reach, chair wheels locked, chair alarm on. Problem: Gas Exchange - Impaired:  Goal: Levels of oxygenation will improve  Description: Levels of oxygenation will improve  6/29/2021 0811 by Kyle Baker  Outcome: Ongoing   Pt remained on room air and SpO2 was WNL.

## 2021-06-30 PROCEDURE — 2580000003 HC RX 258: Performed by: INTERNAL MEDICINE

## 2021-06-30 PROCEDURE — 97530 THERAPEUTIC ACTIVITIES: CPT

## 2021-06-30 PROCEDURE — 97116 GAIT TRAINING THERAPY: CPT

## 2021-06-30 PROCEDURE — G0378 HOSPITAL OBSERVATION PER HR: HCPCS

## 2021-06-30 PROCEDURE — 97165 OT EVAL LOW COMPLEX 30 MIN: CPT

## 2021-06-30 PROCEDURE — 6360000002 HC RX W HCPCS: Performed by: INTERNAL MEDICINE

## 2021-06-30 PROCEDURE — 96372 THER/PROPH/DIAG INJ SC/IM: CPT

## 2021-06-30 PROCEDURE — 6370000000 HC RX 637 (ALT 250 FOR IP): Performed by: INTERNAL MEDICINE

## 2021-06-30 PROCEDURE — 97161 PT EVAL LOW COMPLEX 20 MIN: CPT

## 2021-06-30 PROCEDURE — 97535 SELF CARE MNGMENT TRAINING: CPT

## 2021-06-30 PROCEDURE — 94640 AIRWAY INHALATION TREATMENT: CPT

## 2021-06-30 RX ORDER — ALBUTEROL SULFATE 2.5 MG/3ML
2.5 SOLUTION RESPIRATORY (INHALATION) EVERY 6 HOURS PRN
Status: DISCONTINUED | OUTPATIENT
Start: 2021-06-30 | End: 2021-07-01 | Stop reason: HOSPADM

## 2021-06-30 RX ADMIN — FUROSEMIDE 40 MG: 40 TABLET ORAL at 08:48

## 2021-06-30 RX ADMIN — CLONAZEPAM 1 MG: 1 TABLET ORAL at 15:00

## 2021-06-30 RX ADMIN — FUROSEMIDE 40 MG: 40 TABLET ORAL at 22:01

## 2021-06-30 RX ADMIN — Medication 10 ML: at 22:01

## 2021-06-30 RX ADMIN — SPIRONOLACTONE 50 MG: 50 TABLET ORAL at 08:48

## 2021-06-30 RX ADMIN — RIFAXIMIN 550 MG: 550 TABLET ORAL at 08:49

## 2021-06-30 RX ADMIN — PROPRANOLOL HYDROCHLORIDE 10 MG: 20 TABLET ORAL at 08:50

## 2021-06-30 RX ADMIN — Medication 100 MG: at 08:50

## 2021-06-30 RX ADMIN — CARBAMAZEPINE 200 MG: 200 TABLET ORAL at 22:00

## 2021-06-30 RX ADMIN — CLONAZEPAM 1 MG: 1 TABLET ORAL at 22:01

## 2021-06-30 RX ADMIN — SPIRONOLACTONE 50 MG: 50 TABLET ORAL at 22:01

## 2021-06-30 RX ADMIN — FENOFIBRATE 54 MG: 54 TABLET ORAL at 08:49

## 2021-06-30 RX ADMIN — ENOXAPARIN SODIUM 40 MG: 40 INJECTION SUBCUTANEOUS at 08:50

## 2021-06-30 RX ADMIN — PROPRANOLOL HYDROCHLORIDE 10 MG: 20 TABLET ORAL at 22:00

## 2021-06-30 RX ADMIN — CARBAMAZEPINE 200 MG: 200 TABLET ORAL at 14:59

## 2021-06-30 RX ADMIN — ACETAMINOPHEN 650 MG: 325 TABLET ORAL at 22:00

## 2021-06-30 RX ADMIN — PREDNISONE 40 MG: 20 TABLET ORAL at 08:49

## 2021-06-30 RX ADMIN — PROPRANOLOL HYDROCHLORIDE 10 MG: 20 TABLET ORAL at 14:59

## 2021-06-30 RX ADMIN — POTASSIUM CHLORIDE 20 MEQ: 750 TABLET, EXTENDED RELEASE ORAL at 08:50

## 2021-06-30 RX ADMIN — RIFAXIMIN 550 MG: 550 TABLET ORAL at 22:00

## 2021-06-30 RX ADMIN — CLONAZEPAM 1 MG: 1 TABLET ORAL at 08:49

## 2021-06-30 RX ADMIN — CARBAMAZEPINE 200 MG: 200 TABLET ORAL at 08:50

## 2021-06-30 RX ADMIN — TRAZODONE HYDROCHLORIDE 50 MG: 50 TABLET ORAL at 21:59

## 2021-06-30 RX ADMIN — FAMOTIDINE 20 MG: 20 TABLET ORAL at 08:49

## 2021-06-30 RX ADMIN — Medication 10 ML: at 08:51

## 2021-06-30 ASSESSMENT — PAIN SCALES - GENERAL
PAINLEVEL_OUTOF10: 0
PAINLEVEL_OUTOF10: 4
PAINLEVEL_OUTOF10: 0

## 2021-06-30 ASSESSMENT — PAIN DESCRIPTION - LOCATION: LOCATION: BACK

## 2021-06-30 ASSESSMENT — PAIN DESCRIPTION - DESCRIPTORS: DESCRIPTORS: ACHING;NUMBNESS

## 2021-06-30 ASSESSMENT — PAIN DESCRIPTION - FREQUENCY: FREQUENCY: INTERMITTENT

## 2021-06-30 ASSESSMENT — PAIN DESCRIPTION - ORIENTATION: ORIENTATION: LOWER

## 2021-06-30 ASSESSMENT — PAIN DESCRIPTION - ONSET: ONSET: ON-GOING

## 2021-06-30 ASSESSMENT — PAIN - FUNCTIONAL ASSESSMENT: PAIN_FUNCTIONAL_ASSESSMENT: ACTIVITIES ARE NOT PREVENTED

## 2021-06-30 ASSESSMENT — PAIN DESCRIPTION - PAIN TYPE: TYPE: CHRONIC PAIN

## 2021-06-30 ASSESSMENT — PAIN DESCRIPTION - PROGRESSION: CLINICAL_PROGRESSION: NOT CHANGED

## 2021-06-30 NOTE — PROGRESS NOTES
This RN made aware by social work that [facility] would not accept patient without a 72 hr hold note from MD. This RN reached out to Dr. Abdoulaye Meek for note to be signed. Dr. Katarina Al to floor to discuss pt's discharge. Per Dr. Katarina Al, 72 hr hold note could not be signed as it contradicted note from psych evaluation. It was decided at this time by Dr. Katarina Al that pt should remain in the hospital and precert should be initiated for SNF placement. Charge RN aware. Pt made aware of change in discharge plan, expressed understanding.  Electronically signed by Florencia Tracey RN on 6/29/2021 at 8:08 PM

## 2021-06-30 NOTE — RT PROTOCOL NOTE
medication orders and enter [employer] order with QID frequency and an Albuterol Nebulizer order with frequency of every 2 hours PRN wheezing or increased work of breathing using Per Protocol order mode. Repeat RT Therapy Protocol Assessment with second treatment then QID and as needed. Greater than 13 - discontinue any other Inpatient aerosolized bronchodilator medication orders and enter a DuoNeb Nebulizer order with every 4 hours frequency and an Albuterol Nebulizer order with frequency of every 2 hours PRN wheezing or increased work of breathing using Per Protocol order mode. Repeat RT Therapy Protocol Assessment with second treatment then every 4 hours and as needed. RT to enter RT Home Evaluation for COPD & MDI Assessment order using Per Protocol order mode.     Electronically signed by Italia Blanchard RCP on 6/30/2021 at 7:30 PM

## 2021-06-30 NOTE — PROGRESS NOTES
Physical Therapy    Facility/Department: [place] [address] [facility]  Initial Assessment, Treatment and Discharge  NAME: Cari Buckner  : 1970  MRN: [id number]    Date of Service: 2021    Discharge Recommendations: Cari Buckner scored a  20/24 on the AM-PAC short mobility form. Current research shows that an AM-PAC score of 18 or greater is typically associated with a discharge to the patient's home setting. If patient discharges prior to next session this note will serve as a discharge summary. Please see below for the latest assessment towards goals. PT Equipment Recommendations  Equipment Needed: No    Assessment   Assessment: Pt is 51 yo M with fairly good mobility. Pt is steady on his feet, walking with a wide EMERSON. No further PT indicated at this time. Rec return to [facility]. D/c acute PT. Prognosis: Good  Decision Making: Low Complexity  PT Education: PT Role;General Safety  Patient Education: Pt verbalized understanding  REQUIRES PT FOLLOW UP: No       Patient Diagnosis(es): The primary encounter diagnosis was Acute respiratory failure with hypoxia and hypercapnia ([address] Diagnoses of Pneumonia of both lungs due to infectious organism, unspecified part of lung, Alcohol withdrawal, uncomplicated ([address] and Bipolar 1 disorder ([address] were also pertinent to this visit. has a past medical history of Aggressive behavior, Alcoholism ([address] Angina pectoris ([address] Anxiety, Bipolar 1 disorder ([address] Bipolar disorder ([address] Chronic pain, Cirrhosis ([address] Heart murmur, Hernia of abdominal wall, Hypertension, PTSD (post-traumatic stress disorder), Schizophrenia ([address] Seizures ([address] Thiamine deficiency, and Traumatic brain injury ([address] has no past surgical history on file.     Restrictions  Position Activity Restriction  Other position/activity restrictions: activity as tolerated  Vision/Hearing  Vision: Within Functional Limits  Hearing: Within functional limits     Subjective  General  Chart Reviewed: Yes  Additional Pertinent Hx: 52 y.o. male with significant past medical history of bipolar disorder presented with complaints of shortness of breath. Patient lives at [facility] because of his psychiatric illnesses but may currently be homeless. Recent SNF stay. He is a poor historian and states that he was having symptoms of cough at his nursing facility and requested his rescue inhaler and then showed up in the emergency room. Per the ED note patient presented with difficulty breathing and hypoxic to the upper 80s on room air and was found to be hypercapnic to the 70s on a VBG. He was placed on BiPAP with some improvement. Family / Caregiver Present: No  Referring Practitioner: Dr. Hussein Arnold  Diagnosis: ARF with hypoxia  Subjective  Subjective: Pt supine in bed and agreeable to PT. Pt talkative, tangential, and disoriented to conversation. Requesting to use bathroom and clean up. Pain Screening  Patient Currently in Pain: Denies     Orientation  Orientation  Overall Orientation Status: Impaired (Not oriented to specific date)  Orientation Level: Oriented to place;Oriented to person     Social/Functional History  Social/Functional History  Type of Home:  ([facility])  Bathroom Accessibility: Accessible  Home Equipment: Cane  ADL Assistance: Independent  Ambulation Assistance: Independent (With cane)    Objective          AROM RLE (degrees)  RLE AROM: WFL  AROM LLE (degrees)  LLE AROM : WFL  Strength RLE  Strength RLE: WFL  Strength LLE  Strength LLE: WFL        Bed mobility  Supine to Sit: Modified independent (Using rail)  Scooting: Independent  Transfers  Sit to Stand: Supervision  Stand to sit: Supervision  Ambulation  Ambulation?: Yes  Ambulation 1  Assistance: Supervision  Quality of Gait: Steady gait  Gait Deviations: Slow Shana; Increased EMERSON  Distance: 400 feet     Balance  Sitting - Static: Good  Standing - Static: Good  Standing - Dynamic: Fair        Plan   Safety Devices  Type of devices: Call light within reach, Chair alarm in place, Left in chair, Nurse notified    Goals  Short term goals  Time Frame for Short term goals: No goals to be set due to pt moving well. D/c acute PT.      Therapy Time   Individual Concurrent Group Co-treatment   Time In 1253         Time Out 1347         Minutes 54           Timed Code Treatment Minutes:   39    Total Treatment Minutes:  [id number] [address] [place] [address]

## 2021-06-30 NOTE — FLOWSHEET NOTE
06/30/21 1316   Encounter Summary   Services provided to: Patient   Referral/Consult From: Rounding   Continue Visiting   (6/30, jonny )   Complexity of Encounter High   Length of Encounter 30 minutes   Routine   Type Initial   Assessment Calm; Approachable; Anxious   Intervention Active listening;Explored feelings, thoughts, concerns;Nurtured hope;Prayer   Outcome Expressed gratitude;Engaged in conversation;Expressed feelings/needs/concerns     Staff Estrada MA

## 2021-06-30 NOTE — PROGRESS NOTES
PM assessment complete, VSS at this time. No acute S/S of distress noted at this time. POC discussed, all questions answered. Will continue to monitor.

## 2021-06-30 NOTE — CARE COORDINATION
CM following for discharge planning. Pt did not go back to [facility] last evening due to  72 hour hold and being cleared by Psych. CM called [facility] where patient was lLTC (,prior to going to [facility]), several times today to initiate precert.  stated admission liaison Chandlercoleen Alfaro was not in today but would be taking calls from home. CM 2 left messages for Chandler Alfaro  [phone] to start precert for return. CM department to follow up in am to ensure precert was started. Pt will need transport at discharge.      Zenia Butts RN, BSN, [facility]  Case Management Department  [phone]

## 2021-06-30 NOTE — PROGRESS NOTES
Hospitalist Progress Note      PCP: No primary care provider on file. Date of Admission: 6/28/2021    Chief Complaint: Shortness of breath    Hospital Course: Admitted for acute on chronic hypoxic/hypercapnic respiratory failure secondary to possible underlying obesity hypoventilation/obstructive sleep apnea or COPD/asthma. Patient was placed on BiPAP briefly and improved. On room air and p.o. steroids. Psych consulted and pt does not need inpatient psych. SNF precert started. Subjective: Patient eating breakfast. Pleasant today. States he feels better and happy that he would be going back to [facility]. Does not want to go to [facility].     Medications:  Reviewed    Infusion Medications    sodium chloride       Scheduled Medications    sodium chloride flush  5-40 mL Intravenous 2 times per day    enoxaparin  40 mg Subcutaneous Daily    predniSONE  40 mg Oral Daily    carBAMazepine  200 mg Oral TID    clonazePAM  1 mg Oral TID    famotidine  20 mg Oral Daily    fenofibrate  54 mg Oral Daily    lactulose  20 g Oral BID    nicotine  1 patch Transdermal Q24H    vitamin B-1  100 mg Oral Daily    potassium chloride  20 mEq Oral Daily    furosemide  40 mg Oral BID    propranolol  10 mg Oral TID    rifaximin  550 mg Oral BID    spironolactone  50 mg Oral BID    albuterol-ipratropium  1 puff Inhalation Q4H While awake     PRN Meds: albuterol, sodium chloride flush, sodium chloride, ondansetron **OR** ondansetron, polyethylene glycol, acetaminophen **OR** acetaminophen, albuterol, aluminum & magnesium hydroxide-simethicone, ziprasidone, traZODone      Intake/Output Summary (Last 24 hours) at 6/30/2021 0719  Last data filed at 6/30/2021 0026  Gross per 24 hour   Intake --   Output 125 ml   Net -125 ml       Physical Exam Performed:    BP (!) 139/94   Pulse 96   Temp 97.8 °F (36.6 °C) (Oral)   Resp 18   Ht 6' 1\" (1.854 m)   Wt (!) 326 lb 1 oz (147.9 kg)   SpO2 95%   BMI 43.02 kg/m² General appearance:  No apparent distress, appears stated age and cooperative. Morbidly obese  HEENT:  Normal cephalic, atraumatic without obvious deformity. Pupils equal, round, and reactive to light. Extra ocular muscles intact. Conjunctivae/corneas clear. Neck: Supple, with full range of motion. No jugular venous distention. Trachea midline. Respiratory: Decreased breath sounds bilaterally  Cardiovascular:  Regular rate and rhythm with normal S1/S2 without murmurs, rubs or gallops. Abdomen: Soft, non-tender, non-distended with normal bowel sounds. Musculoskeletal:  No clubbing, cyanosis or edema bilaterally. Full range of motion without deformity. Skin: Skin color, texture, turgor normal.  No rashes or lesions. Neurologic: Grossly non-focal.  Moving all extremities spontaneously  Psychiatric:  Alert and oriented. Pleasant  Capillary Refill: Brisk,< 3 seconds   Peripheral Pulses: +2 palpable, equal bilaterally       Labs:   Recent Labs     06/28/21  0205   WBC 8.4   HGB 12.3*   HCT 37.8*        Recent Labs     06/28/21  0205 06/29/21  0501    140   K 4.2 3.7    101   CO2 31 31   BUN 8 8   CREATININE 0.8* 0.7*   CALCIUM 8.7 9.0     No results for input(s): AST, ALT, BILIDIR, BILITOT, ALKPHOS in the last 72 hours. No results for input(s): INR in the last 72 hours. Recent Labs     06/28/21  0205   TROPONINI <0.01       Urinalysis:      Lab Results   Component Value Date    NITRU Negative 09/11/2019    WBCUA 0 09/11/2019    RBCUA 0 09/11/2019    BLOODU Negative 09/11/2019    SPECGRAV 1.010 09/11/2019    GLUCOSEU Negative 09/11/2019       Radiology:  XR CHEST PORTABLE   Final Result     Diminished lung volumes with subsegmental perihilar and bibasilar    opacities. The pulmonary vasculature appears somewhat prominent and    equalized. Findings are consistent with pulmonary vascular congestion. Bibasilar changes may represent asymmetric edema or atelectasis.   Please    correlate clinically for potential subtle infection. No large pleural    effusion. Assessment/Plan:  Acute on chronic hypoxic/hypercapnic respiratory failure:   Etiology unclear  Might be secondary to underlying obesity hypoventilation/obstructive sleep apnea or possible COPD/asthma exacerbation  Urine drug screen negative  Procalcitonin level. Antibiotic discontinued. No concern for pneumonia  Patient was placed on BiPAP initially but does not want to wear it  Patient taken off of BiPAP and maintaining sats above 90% on room air  Continue to monitor sats  Continue p.o. steroids and breathing treatment as needed  Pulmonology following, appreciate recommendations     Chronic systolic heart failure/hypertension:  EF of 45 to 50% 02/20  Continue home diuretics     History of cirrhosis:  Continue lactulose, Xifaxan and propanolol     Seizure disorder:  Continue home medication     Bipolar disorder/schizophrenia with behavioral disturbances:  Patient was sent from [address] [place] [address] antipsychotic medication  Psychiatry consulted and pt does not need inpatient psych        DVT Prophylaxis: Lovenox  Diet: ADULT DIET;  Regular  Code Status: Full Code    PT/OT Eval Status: Ordered    Dispo -pending placement    Sharon Conteh MD

## 2021-07-01 VITALS
WEIGHT: 315 LBS | DIASTOLIC BLOOD PRESSURE: 88 MMHG | OXYGEN SATURATION: 96 % | BODY MASS INDEX: 41.75 KG/M2 | TEMPERATURE: 98 F | RESPIRATION RATE: 20 BRPM | HEART RATE: 104 BPM | SYSTOLIC BLOOD PRESSURE: 135 MMHG | HEIGHT: 73 IN

## 2021-07-01 PROCEDURE — 96372 THER/PROPH/DIAG INJ SC/IM: CPT

## 2021-07-01 PROCEDURE — G0378 HOSPITAL OBSERVATION PER HR: HCPCS

## 2021-07-01 PROCEDURE — 2580000003 HC RX 258: Performed by: INTERNAL MEDICINE

## 2021-07-01 PROCEDURE — 6370000000 HC RX 637 (ALT 250 FOR IP): Performed by: INTERNAL MEDICINE

## 2021-07-01 PROCEDURE — 6360000002 HC RX W HCPCS: Performed by: INTERNAL MEDICINE

## 2021-07-01 RX ORDER — CLONAZEPAM 1 MG/1
1 TABLET ORAL 3 TIMES DAILY
Qty: 9 TABLET | Refills: 0 | Status: SHIPPED | OUTPATIENT
Start: 2021-07-01 | End: 2021-07-04

## 2021-07-01 RX ORDER — TRAZODONE HYDROCHLORIDE 50 MG/1
50 TABLET ORAL NIGHTLY PRN
Qty: 30 TABLET | Refills: 0 | Status: SHIPPED | OUTPATIENT
Start: 2021-07-01

## 2021-07-01 RX ORDER — LIDOCAINE 4 G/G
1 PATCH TOPICAL DAILY
Status: DISCONTINUED | OUTPATIENT
Start: 2021-07-01 | End: 2021-07-01 | Stop reason: HOSPADM

## 2021-07-01 RX ORDER — GABAPENTIN 300 MG/1
300 CAPSULE ORAL 4 TIMES DAILY
Qty: 120 CAPSULE | Refills: 0 | Status: SHIPPED | OUTPATIENT
Start: 2021-07-01 | End: 2021-07-31

## 2021-07-01 RX ADMIN — FUROSEMIDE 40 MG: 40 TABLET ORAL at 08:59

## 2021-07-01 RX ADMIN — PROPRANOLOL HYDROCHLORIDE 10 MG: 20 TABLET ORAL at 16:27

## 2021-07-01 RX ADMIN — Medication 100 MG: at 08:59

## 2021-07-01 RX ADMIN — CLONAZEPAM 1 MG: 1 TABLET ORAL at 09:00

## 2021-07-01 RX ADMIN — CLONAZEPAM 1 MG: 1 TABLET ORAL at 16:27

## 2021-07-01 RX ADMIN — POTASSIUM CHLORIDE 20 MEQ: 750 TABLET, EXTENDED RELEASE ORAL at 08:58

## 2021-07-01 RX ADMIN — RIFAXIMIN 550 MG: 550 TABLET ORAL at 08:58

## 2021-07-01 RX ADMIN — CARBAMAZEPINE 200 MG: 200 TABLET ORAL at 16:27

## 2021-07-01 RX ADMIN — ENOXAPARIN SODIUM 40 MG: 40 INJECTION SUBCUTANEOUS at 09:00

## 2021-07-01 RX ADMIN — FENOFIBRATE 54 MG: 54 TABLET ORAL at 08:58

## 2021-07-01 RX ADMIN — PROPRANOLOL HYDROCHLORIDE 10 MG: 20 TABLET ORAL at 09:03

## 2021-07-01 RX ADMIN — FUROSEMIDE 40 MG: 40 TABLET ORAL at 16:27

## 2021-07-01 RX ADMIN — Medication 10 ML: at 09:01

## 2021-07-01 RX ADMIN — CARBAMAZEPINE 200 MG: 200 TABLET ORAL at 09:14

## 2021-07-01 RX ADMIN — FAMOTIDINE 20 MG: 20 TABLET ORAL at 08:59

## 2021-07-01 RX ADMIN — ACETAMINOPHEN 650 MG: 325 TABLET ORAL at 08:59

## 2021-07-01 RX ADMIN — PREDNISONE 40 MG: 20 TABLET ORAL at 08:59

## 2021-07-01 RX ADMIN — SPIRONOLACTONE 50 MG: 50 TABLET ORAL at 08:59

## 2021-07-01 ASSESSMENT — PAIN DESCRIPTION - PAIN TYPE
TYPE: CHRONIC PAIN

## 2021-07-01 ASSESSMENT — PAIN DESCRIPTION - FREQUENCY: FREQUENCY: INTERMITTENT

## 2021-07-01 ASSESSMENT — PAIN DESCRIPTION - ORIENTATION
ORIENTATION: LOWER

## 2021-07-01 ASSESSMENT — PAIN SCALES - GENERAL
PAINLEVEL_OUTOF10: 10
PAINLEVEL_OUTOF10: 10
PAINLEVEL_OUTOF10: 5
PAINLEVEL_OUTOF10: 7
PAINLEVEL_OUTOF10: 0

## 2021-07-01 ASSESSMENT — PAIN DESCRIPTION - DESCRIPTORS: DESCRIPTORS: ACHING

## 2021-07-01 ASSESSMENT — PAIN DESCRIPTION - LOCATION
LOCATION: BACK

## 2021-07-01 ASSESSMENT — PAIN DESCRIPTION - ONSET: ONSET: ON-GOING

## 2021-07-01 NOTE — DISCHARGE SUMMARY
Hospital Medicine Discharge Summary    Patient ID: Adwoa Mcneil      Patient's PCP: No primary care provider on file. Admit Date: 6/28/2021     Discharge Date:  07/01/21    Admitting Physician: Eva Salinas MD     Discharge Physician: Jazmine Garcia MD     Discharge Diagnoses: Active Hospital Problems    Diagnosis Date Noted    Acute respiratory failure with hypoxia and hypercapnia (HCC) [J96.01, J96.02] 06/28/2021    Hypoxia [R09.02] 06/28/2021       The patient was seen and examined on day of discharge and this discharge summary is in conjunction with any daily progress note from day of discharge. Hospital Course:   Patient was admitted and treated for following:    Acute on chronic hypoxic/hypercapnic respiratory failure:   Etiology unclear. Might be secondary to underlying obesity hypoventilation/obstructive sleep apnea or possible COPD/asthma exacerbation. Patient was placed on BiPAP and antibiotics were started. Procalcitonin level was low. There was no concern for pneumonia. Antibiotics were discontinued. Urine drug screen was negative. Patient was taken off BiPAP and maintain sats above 90% on room air.  P.o. steroids were continued for 5 days. As needed breathing treatments were continued. Patient to follow-up with PCP or pulmonologist/sleep medicine doctor outpatient.     Chronic systolic heart failure/hypertension:  EF of 45 to 50% 02/20. Diuretics were continued     History of cirrhosis:  Lactulose, Xifaxan and propanolol was continued     Seizure disorder:  Carbamazepine was continued     Bipolar disorder/schizophrenia with behavioral disturbances:  Clonazepam and as needed Geodon was continued. Patient was evaluated by psychiatry and did not need inpatient psychiatric unit.   Patient to follow-up with psychiatry outpatient          Physical Exam Performed:     /82   Pulse 93   Temp 98.2 °F (36.8 °C) (Oral)   Resp 20   Ht 6' 1\" (1.854 m)   Wt (!) 326 lb 1 oz (147.9 kg)   SpO2 97%   BMI 43.02 kg/m²     General appearance:  No apparent distress, appears stated age and cooperative.  Morbidly obese  HEENT:  Normal cephalic, atraumatic without obvious deformity. Pupils equal, round, and reactive to light.  Extra ocular muscles intact. Conjunctivae/corneas clear. Neck: Supple, with full range of motion. No jugular venous distention. Trachea midline. Respiratory: Decreased breath sounds bilaterally  Cardiovascular:  Regular rate and rhythm with normal S1/S2 without murmurs, rubs or gallops. Abdomen: Soft, non-tender, non-distended with normal bowel sounds. Musculoskeletal:  No clubbing, cyanosis or edema bilaterally.  Full range of motion without deformity. Skin: Skin color, texture, turgor normal.  No rashes or lesions. Neurologic: Grossly non-focal.  Moving all extremities spontaneously  Psychiatric:  Alert and oriented.  Pleasant  Capillary Refill: Brisk,< 3 seconds   Peripheral Pulses: +2 palpable, equal bilaterally     Labs: For convenience and continuity at follow-up the following most recent labs are provided:      CBC:    Lab Results   Component Value Date    WBC 8.4 06/28/2021    HGB 12.3 06/28/2021    HCT 37.8 06/28/2021     06/28/2021       Renal:    Lab Results   Component Value Date     06/29/2021    K 3.7 06/29/2021     06/29/2021    CO2 31 06/29/2021    BUN 8 06/29/2021    CREATININE 0.7 06/29/2021    CALCIUM 9.0 06/29/2021    PHOS 4.4 09/16/2019         Significant Diagnostic Studies    Radiology:   XR CHEST PORTABLE   Final Result     Diminished lung volumes with subsegmental perihilar and bibasilar    opacities. The pulmonary vasculature appears somewhat prominent and    equalized. Findings are consistent with pulmonary vascular congestion. Bibasilar changes may represent asymmetric edema or atelectasis. Please    correlate clinically for potential subtle infection. No large pleural    effusion. Consults:     IP CONSULT TO HOSPITALIST  IP CONSULT TO SOCIAL WORK  IP CONSULT TO PULMONOLOGY  IP CONSULT TO PSYCHIATRY    Disposition:  SNF    Condition at Discharge: Stable    Discharge Instructions/Follow-up:  Follow up psychiatry. Outpatient sleep apnea evaluation or pulmonology evaluation    Code Status:  Full Code     Activity: activity as tolerated    Diet: regular diet      Discharge Medications:     Current Discharge Medication List           Details   predniSONE (DELTASONE) 20 MG tablet Take 2 tablets by mouth daily for 4 days  Qty: 8 tablet, Refills: 0              Details   clonazePAM (KLONOPIN) 1 MG tablet Take 1 tablet by mouth 3 times daily for 3 days. Qty: 9 tablet, Refills: 0    Associated Diagnoses: Alcohol withdrawal, uncomplicated ([address] Bipolar 1 disorder (HCC)      gabapentin (NEURONTIN) 300 MG capsule Take 1 capsule by mouth 4 times daily for 30 days.   Qty: 120 capsule, Refills: 0      traZODone (DESYREL) 50 MG tablet Take 1 tablet by mouth nightly as needed for Sleep  Qty: 30 tablet, Refills: 0              Details   rifaximin (XIFAXAN) 550 MG tablet Take 550 mg by mouth 2 times daily      lidocaine (LIDODERM) 5 % Place 1 patch onto the skin daily 12 hours on, 12 hours off.      potassium chloride (MICRO-K) 10 MEQ extended release capsule Take 20 mEq by mouth daily      fenofibrate (TRICOR) 48 MG tablet Take 48 mg by mouth daily      famotidine (PEPCID) 20 MG tablet Take 20 mg by mouth daily      vitamin B-1 (THIAMINE) 100 MG tablet Take 100 mg by mouth daily      nicotine (NICODERM CQ) 21 MG/24HR Place 1 patch onto the skin every 24 hours      spironolactone (ALDACTONE) 25 MG tablet Take 50 mg by mouth 2 times daily      lactulose (CHRONULAC) 10 GM/15ML solution Take 20 g by mouth 2 times daily      furosemide (LASIX) 40 MG tablet Take 40 mg by mouth 2 times daily      carBAMazepine (TEGRETOL) 200 MG tablet Take 200 mg by mouth 3 times daily      propranolol (INDERAL) 20 MG tablet Take 10 mg by mouth 3 times daily      albuterol sulfate HFA (VENTOLIN HFA) 108 (90 Base) MCG/ACT inhaler Inhale 2 puffs into the lungs every 6 hours as needed for Wheezing      nitroGLYCERIN (NITROSTAT) 0.4 MG SL tablet Place 0.4 mg under the tongue every 5 minutes as needed for Chest pain up to max of 3 total doses. If no relief after 1 dose, call 911.      ziprasidone (GEODON) 20 MG injection Inject 20 mg into the muscle 2 times daily as needed (psychosis)      magnesium hydroxide (MILK OF MAGNESIA) 400 MG/5ML suspension Take 30 mLs by mouth daily as needed for Constipation      aluminum & magnesium hydroxide-simethicone (MYLANTA) 400-400-40 MG/5ML SUSP Take 30 mLs by mouth every 4 hours as needed      acetaminophen (TYLENOL) 325 MG tablet Take 650 mg by mouth every 6 hours as needed for Pain             Time Spent on discharge is more than 30 minutes in the examination, evaluation, counseling and review of medications and discharge plan.       Signed:    Florencia Mosley MD   7/1/2021

## 2021-07-01 NOTE — CARE COORDINATION
Case Management Assessment           Daily Note                 Date/ Time of Note: 7/1/2021 9:32 AM         Note completed by: Héctor Campbell RN    Patient Name: Jhonny Manley  YOB: 1970    Diagnosis:Acute respiratory failure with hypoxia and hypercapnia ([address] [J96.01, J96.02]  Hypoxia [R09.02]  Patient Admission Status: Inpatient    Date of Admission:6/28/2021  1:27 AM Length of Stay: 0 GLOS:      Current Plan of Care: awaiting return call from [address] [place] [address] regarding pre-cert and ability for patient to return at [facility]  ________________________________________________________________________________________  PT AM-PAC: 20 / 24 per last evaluation on: 06.30.2021    OT AM-PAC: 19 / 24 per last evaluation on: 06.30.2021    DME Needs for discharge:   ________________________________________________________________________________________  Discharge Plan: [address] [place] [address] ([facility]    Tentative discharge date: 06/11/85  (once pre-cert obtained)    Current barriers to discharge: pre-cert for return    Referrals completed: Not Applicable    Resources/ information provided: Not indicated at this time  ________________________________________________________________________________________  Case Management Notes: ANTON continues to follow for discharge planning and needs. ANTON has called Chandler Alfaro ([phone] and [phone]) and left 2 voicemails this AM requesting return call regarding patient and pre-cert status. CM also faxed ([phone]) updated clinicals and request for return call on fax cover sheet regarding patient. CM awaiting return call at this time. Patient will need transport at [facility].    9:59 AM  CM spoke with Nicole in admissions, she was out sick yesterday and was unable to get a pre-cert started.  She has started the pre-cert this AM for patient to return to [facility], it will not be for skilled but for long term placement as a behavioral bed placement for the patient. She will update CM once pre-cert obtained from [address] [place] [address]. 12:41 PM  ANTON spoke with Nicole in admissions, she reports that they can accept the patient today prior to pre-cert, CM able to secure ambulance transport for patient at [facility] today. Facility in agreement with transport time, patient, RN and MD aware of DC. Nursing to call report to [phone]  Orders to be faxed to: [phone]    Dread Santoyo and his family were provided with choice of provider; he and his family are in agreement with the discharge plan.     Care Transition Patient: Cony García RN  [facility] [employer]  Case Management Department  Ph: [phone]  Fax: [phone]

## 2021-07-01 NOTE — PLAN OF CARE
Problem: Falls - Risk of:  Goal: Will remain free from falls  Description: Will remain free from falls  Outcome: Ongoing     Problem: Infection:  Goal: Will remain free from infection  Description: Will remain free from infection  Outcome: Ongoing     Problem: Safety:  Goal: Free from accidental physical injury  Description: Free from accidental physical injury  Outcome: Ongoing     Problem: Daily Care:  Goal: Daily care needs are met  Description: Daily care needs are met  Outcome: Ongoing     Problem: Pain:  Goal: Patient's pain/discomfort is manageable  Description: Patient's pain/discomfort is manageable  Outcome: Ongoing

## 2025-04-26 NOTE — PROGRESS NOTES
Occupational Therapy   Occupational Therapy Initial Assessment, Tx, DC  Date: 2021   Patient Name: Mai Soler  MRN: [id number]     : 1970    Date of Service: 2021    Discharge Recommendations: Mai Soler scored a 19/24 on the AM-PAC ADL Inpatient form. Current research shows that an AM-PAC score of 18 or greater is typically associated with a discharge to the patient's home setting. Based on the patient's AM-PAC score, and their current ADL deficits, it is recommended that the patient have 2-3 sessions per week of Occupational Therapy at d/c to increase the patient's independence. At this time, this patient demonstrates the endurance and safety to discharge with  (home vs OP services) and a follow up treatment frequency of 2-3x/wk. Rec return to facility with prior level of care. Please see assessment section for further patient specific details. If patient discharges prior to next session this note will serve as a discharge summary. Please see below for the latest assessment towards goals. OT Equipment Recommendations  Equipment Needed: No    Assessment   Assessment: Pt admit from [facility] psych facility, prior to that at [facility]. Pt reporting ind with ADLs, fxl mobility and transfers. Admit for respiratory failure. Pt moving well this date with SBA to [facility] for all fxl mobility s/o AD. Pt completing ADLs this date with setup/SBA to Supervision. Pt with no further acute care OT needs at this time. DC acute OT. Rec rtn to facility with prior level of care. Decision Making: Low Complexity  OT Education: OT Role;Plan of Care;ADL Adaptive Strategies;Transfer Training;Energy Conservation  Patient Education: verb understanding  Barriers to Learning: psych  REQUIRES OT FOLLOW UP: No  Activity Tolerance  Activity Tolerance: Patient Tolerated treatment well  Safety Devices  Safety Devices in place: Yes  Type of devices:  All fall risk precautions in place;Gait belt;Call light within reach; Chair alarm in place; Left in chair;Nurse notified           Patient Diagnosis(es): The primary encounter diagnosis was Acute respiratory failure with hypoxia and hypercapnia ([facility] Diagnoses of Pneumonia of both lungs due to infectious organism, unspecified part of lung, Alcohol withdrawal, uncomplicated ([address] and Bipolar 1 disorder ([address] were also pertinent to this visit. has a past medical history of Aggressive behavior, Alcoholism ([address] Angina pectoris ([address] Anxiety, Bipolar 1 disorder ([address] Bipolar disorder ([address] Chronic pain, Cirrhosis ([address] Heart murmur, Hernia of abdominal wall, Hypertension, PTSD (post-traumatic stress disorder), Schizophrenia ([address] Seizures ([address] Thiamine deficiency, and Traumatic brain injury ([facility] [address] [facility] has no past surgical history on file. Restrictions  Position Activity Restriction  Other position/activity restrictions: activity as tolerated    Subjective   General  Chart Reviewed: Yes  Additional Pertinent Hx: 52 y.o. male with significant past medical history of bipolar disorder presented with complaints of shortness of breath. Patient lives at [facility] because of his psychiatric illnesses but may currently be homeless. Recent SNF stay. He is a poor historian and states that he was having symptoms of cough at his nursing facility and requested his rescue inhaler and then showed up in the emergency room. Per the ED note patient presented with difficulty breathing and hypoxic to the upper 80s on room air and was found to be hypercapnic to the 70s on a VBG. He was placed on BiPAP with some improvement.   Referring Practitioner: Ori Rolon MD  Diagnosis: respiratory failure  Subjective  Subjective: Pt in bed upon arrival, agreeable to session  Patient Currently in Pain: Denies  Vital Signs  Pulse: 111  BP: (!) 148/82  Patient Currently in Pain: Denies  Social/Functional History  Social/Functional History  Type of Home:  [facility])  Bathroom Accessibility: Accessible  Home Equipment: Cane  ADL Assistance: Independent  Ambulation Assistance: Independent (With cane)       Objective        Orientation  Overall Orientation Status: Within Functional Limits     Balance  Sitting Balance: Supervision  Standing Balance: Stand by assistance (to spvn)  Standing Balance  Time: ~10-15 min  Activity: in room, to and from bathroom, stance at sink/toilet, hallway 400'  Functional Mobility  Functional - Mobility Device: No device  Activity: To/from bathroom; Other  Assist Level: Stand by assistance  Functional Mobility Comments: SBA to [facility]  Toilet Transfers  Toilet - Technique: Ambulating  Equipment Used: Standard toilet  Toilet Transfer: Stand by assistance;Supervision  ADL  Feeding: Independent  Grooming: Contact guard assistance;Stand by assistance (stance at sink)  UE Bathing: Setup;Stand by assistance  LE Bathing: Setup;Stand by assistance  UE Dressing: Stand by assistance  LE Dressing:  (able to adjust pants string and pull down for stance at toilet)  Toileting: Contact guard assistance;Stand by assistance (in stance)        Bed mobility  Supine to Sit: Modified independent  Comment: left in chair end of session  Transfers  Sit to stand: Stand by assistance;Supervision  Stand to sit: Stand by assistance;Supervision                       LUE AROM (degrees)  LUE AROM : WFL  Left Hand AROM (degrees)  Left Hand AROM: WFL  RUE AROM (degrees)  RUE AROM : WFL  Right Hand AROM (degrees)  Right Hand AROM: WFL  LUE Strength  Gross LUE Strength: WFL  RUE Strength  Gross RUE Strength: WFL                     AM-PAC Score        AM-PAC Inpatient Daily Activity Raw Score: 19 (06/30/21 1525)  AM-PAC Inpatient ADL T-Scale Score : 40.22 (06/30/21 1525)  ADL Inpatient CMS 0-100% Score: 42.8 (06/30/21 1525)  ADL Inpatient CMS G-Code Modifier : CK (06/30/21 1525)      Therapy Time   Individual Concurrent Group Co-treatment   Time In 1253         Time Out 1346 Minutes 53           Timed Code Treatment Minutes:   40    Total Treatment Minutes:  [address], MOT, OTR/L no